# Patient Record
Sex: FEMALE | Race: WHITE | NOT HISPANIC OR LATINO | Employment: PART TIME | URBAN - METROPOLITAN AREA
[De-identification: names, ages, dates, MRNs, and addresses within clinical notes are randomized per-mention and may not be internally consistent; named-entity substitution may affect disease eponyms.]

---

## 2017-04-07 ASSESSMENT — MIFFLIN-ST. JEOR: SCORE: 1620.9

## 2017-04-11 ENCOUNTER — ANESTHESIA - HEALTHEAST (OUTPATIENT)
Dept: SURGERY | Facility: CLINIC | Age: 62
End: 2017-04-11

## 2017-04-11 ENCOUNTER — SURGERY - HEALTHEAST (OUTPATIENT)
Dept: SURGERY | Facility: CLINIC | Age: 62
End: 2017-04-11

## 2017-04-13 ASSESSMENT — MIFFLIN-ST. JEOR: SCORE: 1620.9

## 2017-04-14 ASSESSMENT — MIFFLIN-ST. JEOR: SCORE: 1693.48

## 2017-04-16 VITALS
OXYGEN SATURATION: 92 % | TEMPERATURE: 98.8 F | WEIGHT: 245 LBS | DIASTOLIC BLOOD PRESSURE: 64 MMHG | RESPIRATION RATE: 16 BRPM | SYSTOLIC BLOOD PRESSURE: 120 MMHG | HEART RATE: 78 BPM

## 2017-04-16 RX ORDER — MULTIPLE VITAMINS W/ MINERALS TAB 9MG-400MCG
1 TAB ORAL EVERY MORNING
COMMUNITY

## 2017-04-16 RX ORDER — CALCIUM POLYCARBOPHIL 625 MG 625 MG/1
2 TABLET ORAL AT BEDTIME
COMMUNITY

## 2017-04-16 RX ORDER — PNV NO.95/FERROUS FUM/FOLIC AC 28MG-0.8MG
2 TABLET ORAL AT BEDTIME
COMMUNITY

## 2017-04-16 RX ORDER — AMOXICILLIN 250 MG
2 CAPSULE ORAL 2 TIMES DAILY
COMMUNITY

## 2017-04-16 RX ORDER — RISPERIDONE 4 MG/1
4 TABLET ORAL DAILY
COMMUNITY

## 2017-04-16 RX ORDER — DULOXETIN HYDROCHLORIDE 60 MG/1
120 CAPSULE, DELAYED RELEASE ORAL EVERY MORNING
COMMUNITY

## 2017-04-16 RX ORDER — ATENOLOL 25 MG/1
25 TABLET ORAL EVERY MORNING
COMMUNITY

## 2017-04-16 RX ORDER — OXYBUTYNIN CHLORIDE 10 MG/1
10 TABLET, EXTENDED RELEASE ORAL DAILY
Status: ON HOLD | COMMUNITY
End: 2023-07-26

## 2017-04-17 ENCOUNTER — NURSING HOME VISIT (OUTPATIENT)
Dept: GERIATRICS | Facility: CLINIC | Age: 62
End: 2017-04-17
Payer: MEDICARE

## 2017-04-17 DIAGNOSIS — Z96.611 S/P REVERSE TOTAL SHOULDER ARTHROPLASTY, RIGHT: Primary | ICD-10-CM

## 2017-04-17 DIAGNOSIS — M19.011 PRIMARY OSTEOARTHRITIS OF RIGHT SHOULDER: ICD-10-CM

## 2017-04-17 DIAGNOSIS — I10 ESSENTIAL HYPERTENSION: ICD-10-CM

## 2017-04-17 PROCEDURE — 99306 1ST NF CARE HIGH MDM 50: CPT | Performed by: FAMILY MEDICINE

## 2017-04-17 PROCEDURE — 99207 ZZC CDG-CORRECTLY CODED, REVIEWED AND AGREE: CPT | Performed by: FAMILY MEDICINE

## 2017-04-17 NOTE — MR AVS SNAPSHOT
"              After Visit Summary   4/17/2017    Deepthi Gomez    MRN: [id number]           Patient Information     Date Of Birth          1955        Visit Information        Provider Department      4/17/2017 8:30 AM Angely Montes MD [facility]        Today's Diagnoses     S/p reverse total shoulder arthroplasty, right    -  1    Essential hypertension        Primary osteoarthritis of right shoulder          Care Instructions    Orders:  1.  Decrease tyl #3 1 tab po q 6 hours.  2.  DC previous tyl #3 orders.  3.  Follow up with Dr. Miller in 2 weeks. S/p TRSA -right by Dr Miller on 4/11/17.   4.  CBC, BMP on4/12/17        Follow-ups after your visit        Who to contact     If you have questions or need follow up information about today's clinic visit or your schedule please contact [facility] directly at [phone].  Normal or non-critical lab and imaging results will be communicated to you by MyChart, letter or phone within 4 business days after the clinic has received the results. If you do not hear from us within 7 days, please contact the clinic through Teramindhart or phone. If you have a critical or abnormal lab result, we will notify you by phone as soon as possible.  Submit refill requests through [email or web address] or call your pharmacy and they will forward the refill request to us. Please allow 3 business days for your refill to be completed.          Additional Information About Your Visit        MyChart Information     [email or web address] lets you send messages to your doctor, view your test results, renew your prescriptions, schedule appointments and more. To sign up, go to [email or web address] . Click on \"Log in\" on the left side of the screen, which will take you to the Welcome page. Then click on \"Sign up Now\" on the right side of the page.     You will be asked to enter the access code listed below, as well as some personal information. Please follow the " directions to create your username and password.     Your access code is: [email or web address]  Expires: 2017  3:02 PM     Your access code will  in 90 days. If you need help or a new code, please call your [place] clinic or [phone].        Care EveryWhere ID     This is your Care EveryWhere ID. This could be used by other organizations to access your [place] medical records  [id number]        Your Vitals Were     Pulse Temperature Respirations Pulse Oximetry          78 98.8  F (37.1  C) 16 92%         Blood Pressure from Last 3 Encounters:   17 120/64    Weight from Last 3 Encounters:   17 245 lb (111.1 kg)              Today, you had the following     No orders found for display       Primary Care Provider    None Specified       No primary provider on file.        Thank you!     Thank you for choosing [facility]  for your care. Our goal is always to provide you with excellent care. Hearing back from our patients is one way we can continue to improve our services. Please take a few minutes to complete the written survey that you may receive in the mail after your visit with us. Thank you!             Your Updated Medication List - Protect others around you: Learn how to safely use, store and throw away your medicines at www.disposemymeds.org.          This list is accurate as of: 17 11:59 PM.  Always use your most recent med list.                   Brand Name Dispense Instructions for use    acetaminophen-codeine 300-30 MG per tablet    TYLENOL #3     Take 1 tablet by mouth every 6 hours as needed for moderate pain       ASPIRIN PO      Take 325 mg by mouth 2 times daily       ATENOLOL PO      Take 25 mg by mouth       calcium polycarbophil 625 MG tablet    FIBERCON     Take 2 tablets by mouth daily       DULOXETINE HCL PO      Take 120 mg by mouth daily       FISH OIL OMEGA-3 1000 MG Caps      Take 2 capsules by mouth At Bedtime       GABAPENTIN PO      Take 100 mg  by mouth 3 times daily       menthol 5 % Ptch    ICY HOT     Apply 1 patch topically every 8 hours as needed for muscle soreness       metroNIDAZOLE 0.75 % cream    METROCREAM     Apply topically At Bedtime       multivitamin, therapeutic with minerals Tabs tablet      Take 1 tablet by mouth daily       oxybutynin 10 MG 24 hr tablet    DITROPAN-XL     Take 10 mg by mouth daily       OXYCODONE HCL PO      Take 2.5-5 mg by mouth every 4 hours as needed       RANITIDINE HCL PO      Take 150 mg by mouth 2 times daily       RISPERIDONE PO      Take 4 mg by mouth 2 times daily       senna-docusate 8.6-50 MG per tablet    SENOKOT-S;PERICOLACE     Take 1 tablet by mouth daily       Sodium Chloride 0.65 % Soln      Spray 1 spray in nostril 2 times daily       TYLENOL PO      Take 325-650 mg by mouth every 6 hours as needed for mild pain or fever

## 2017-04-17 NOTE — PROGRESS NOTES
[facility]  PRIMARY CARE PROVIDER AND CLINIC:  No primary care provider on file. No primary physician on file.  Chief Complaint   Patient presents with     Hospital F/U       HPI:    Deepthi Gomez is a 61 year old  (1955),admitted to the [facility] from.  Hospital stay 4/14/17.  Admitted to this facility for  rehab, medical management and nursing care. Current issues are:        1. S/p reverse total shoulder arthroplasty, right -S/p TRSA -right by Dr Miller on 4/11/17. Patient feels too sedated 2 tylenol #3 - would like to decrease to 1 Tyl l #3. Has prn oxycodone for sever pain      2. Essential hypertension -- on atenolol , asa. Patient denies any exertional chest pain, dyspnea, palpitations, syncope, orthopnea, edema or paroxysmal nocturnal dyspnea.     3. Primary osteoarthritis of right shoulder - on tyl           CODE STATUS/ADVANCE DIRECTIVES DISCUSSION:   CPR/Full code   Patient's living condition: lives in a group home     ALLERGIES:Penicillins and Sulfa drugs  PAST MEDICAL HISTORY:  has no past medical history on file.  PAST SURGICAL HISTORY:  has no past surgical history on file.  FAMILY HISTORY: family history is not on file.  SOCIAL HISTORY:      Post Discharge Medication Reconciliation Status: discharge medications reconciled, continue medications without change.  Current Outpatient Prescriptions   Medication Sig Dispense Refill     Omega-3 Fatty Acids (FISH OIL OMEGA-3) 1000 MG CAPS Take 2 capsules by mouth At Bedtime       multivitamin, therapeutic with minerals (THERA-VIT-M) TABS tablet Take 1 tablet by mouth daily       acetaminophen-codeine (TYLENOL #3) 300-30 MG per tablet Take 1-2 tablets by mouth every 6 hours as needed for moderate pain       ASPIRIN PO Take 325 mg by mouth 2 times daily       ATENOLOL PO Take 25 mg by mouth       calcium polycarbophil (FIBERCON) 625 MG tablet Take 2 tablets by mouth daily       DULOXETINE HCL PO Take 120 mg by mouth daily        GABAPENTIN PO Take 100 mg by mouth 3 times daily       menthol (ICY HOT) 5 % PTCH Apply 1 patch topically every 8 hours as needed for muscle soreness       metroNIDAZOLE (METROCREAM) 0.75 % cream Apply topically At Bedtime       oxybutynin (DITROPAN-XL) 10 MG 24 hr tablet Take 10 mg by mouth daily       OXYCODONE HCL PO Take 2.5-5 mg by mouth every 4 hours as needed       RANITIDINE HCL PO Take 150 mg by mouth 2 times daily       RISPERIDONE PO Take 4 mg by mouth 2 times daily       senna-docusate (SENOKOT-S;PERICOLACE) 8.6-50 MG per tablet Take 1 tablet by mouth daily       Saline (SODIUM CHLORIDE) 0.65 % SOLN Spray 1 spray in nostril 2 times daily       Acetaminophen (TYLENOL PO) Take 325-650 mg by mouth every 6 hours as needed for mild pain or fever         ROS:  10 point ROS of systems including Constitutional, Eyes, Respiratory, Cardiovascular, Gastroenterology, Genitourinary, Integumentary, Muscularskeletal, Psychiatric were all negative except for pertinent positives noted in my HPI.    Exam:  /64  Pulse 78  Temp 98.8  F (37.1  C)  Resp 16  Wt 245 lb (111.1 kg)  SpO2 92%      Gen: sitting in chair, alert, cooperative and in no acute distress  HEENT: normocephalic; oropharynx clear  Card: RRR, S1, S2, no murmurs  Resp: lungs clear to auscultation bilaterally, no wheezes or crackles  GI: abdomen soft, not-tender  MSK: normal muscle tone, no LE edema;  Right shoulder incision c/d/i- wearing right shoulder immobilizer   Neuro: CX II-XII grossly in tact; ROM in all four extremities grossly in tact  Psych: alert and oriented x3; normal affect  Skin: warm, no suspicious rashes or lesions noted      ASSESSMENT/PLAN:  1. S/p reverse total shoulder arthroplasty, right    2. Essential hypertension    3. Primary osteoarthritis of right shoulder          Orders:  1.  Decrease tyl #3 1 tab po q 6 hours.  2.  DC previous tyl #3 orders.  3.  Follow up with Dr. Miller in 2 weeks. S/p TRSA -right by Dr Miller  on 4/11/17.   4.  CBC, BMP on4/12/17      Information reviewed:  Medications, vital signs, orders, nursing notes, problem list, hospital information. Total time spent with patient visit was 45 min including patient visit and review of past records. Greater than 50% of total time spent with counseling and coordinating care.    Electronically signed by:  Angely Montes MD, MD

## 2017-04-17 NOTE — PATIENT INSTRUCTIONS
Orders:  1.  Decrease tyl #3 1 tab po q 6 hours.  2.  DC previous tyl #3 orders.  3.  Follow up with Dr. Miller in 2 weeks. S/p TRSA -right by Dr Miller on 4/11/17.   4.  CBC, BMP on4/12/17

## 2017-04-23 VITALS
BODY MASS INDEX: 38.14 KG/M2 | OXYGEN SATURATION: 92 % | HEART RATE: 69 BPM | SYSTOLIC BLOOD PRESSURE: 106 MMHG | HEIGHT: 67 IN | RESPIRATION RATE: 19 BRPM | TEMPERATURE: 98.5 F | WEIGHT: 243 LBS | DIASTOLIC BLOOD PRESSURE: 56 MMHG

## 2017-04-24 ENCOUNTER — NURSING HOME VISIT (OUTPATIENT)
Dept: GERIATRICS | Facility: CLINIC | Age: 62
End: 2017-04-24
Payer: MEDICARE

## 2017-04-24 DIAGNOSIS — Z96.611 S/P REVERSE TOTAL SHOULDER ARTHROPLASTY, RIGHT: Primary | ICD-10-CM

## 2017-04-24 DIAGNOSIS — I10 ESSENTIAL HYPERTENSION: ICD-10-CM

## 2017-04-24 DIAGNOSIS — M19.011 PRIMARY OSTEOARTHRITIS OF RIGHT SHOULDER: ICD-10-CM

## 2017-04-24 PROCEDURE — 99207 ZZC CDG-CORRECTLY CODED, REVIEWED AND AGREE: CPT | Performed by: FAMILY MEDICINE

## 2017-04-24 PROCEDURE — 99309 SBSQ NF CARE MODERATE MDM 30: CPT | Performed by: FAMILY MEDICINE

## 2017-04-24 NOTE — MR AVS SNAPSHOT
"              After Visit Summary   4/24/2017    Deepthi Gomez    MRN: [id number]           Patient Information     Date Of Birth          1955        Visit Information        Provider Department      4/24/2017 10:30 AM Angely Montes MD [facility] Transitional [facility]        Today's Diagnoses     S/p reverse total shoulder arthroplasty, right    -  1    Essential hypertension        Primary osteoarthritis of right shoulder          Care Instructions    Orders:  1.  Follow-up with Ortho 5/2/17.  2.  Anticipate D/C on Friday.        Follow-ups after your visit        Who to contact     If you have questions or need follow up information about today's clinic visit or your schedule please contact [facility] directly at [phone].  Normal or non-critical lab and imaging results will be communicated to you by CUPRhart, letter or phone within 4 business days after the clinic has received the results. If you do not hear from us within 7 days, please contact the clinic through CUPRhart or phone. If you have a critical or abnormal lab result, we will notify you by phone as soon as possible.  Submit refill requests through NanoPotential or call your pharmacy and they will forward the refill request to us. Please allow 3 business days for your refill to be completed.          Additional Information About Your Visit        MyChart Information     NanoPotential lets you send messages to your doctor, view your test results, renew your prescriptions, schedule appointments and more. To sign up, go to [email or web address] . Click on \"Log in\" on the left side of the screen, which will take you to the Welcome page. Then click on \"Sign up Now\" on the right side of the page.     You will be asked to enter the access code listed below, as well as some personal information. Please follow the directions to create your username and password.     Your access code is: [email or web address]  Expires: 7/20/2017  3:02 PM     Your " "access code will  in 90 days. If you need help or a new code, please call your [place] clinic or [phone].        Care EveryWhere ID     This is your Care EveryWhere ID. This could be used by other organizations to access your [place] medical records  [id number]        Your Vitals Were     Pulse Temperature Respirations Height Pulse Oximetry BMI (Body Mass Index)    69 98.5  F (36.9  C) 19 5' 7\" (1.702 m) 92% 38.06 kg/m2       Blood Pressure from Last 3 Encounters:   17 106/56   17 120/64    Weight from Last 3 Encounters:   17 243 lb (110.2 kg)   17 245 lb (111.1 kg)              Today, you had the following     No orders found for display       Primary Care Provider    None Specified       No primary provider on file.        Thank you!     Thank you for choosing [facility]  for your care. Our goal is always to provide you with excellent care. Hearing back from our patients is one way we can continue to improve our services. Please take a few minutes to complete the written survey that you may receive in the mail after your visit with us. Thank you!             Your Updated Medication List - Protect others around you: Learn how to safely use, store and throw away your medicines at www.disposemymeds.org.          This list is accurate as of: 17 11:59 PM.  Always use your most recent med list.                   Brand Name Dispense Instructions for use    acetaminophen-codeine 300-30 MG per tablet    TYLENOL #3     Take 1 tablet by mouth every 6 hours as needed for moderate pain       ASPIRIN PO      Take 325 mg by mouth 2 times daily       ATENOLOL PO      Take 25 mg by mouth       calcium polycarbophil 625 MG tablet    FIBERCON     Take 2 tablets by mouth daily       DULOXETINE HCL PO      Take 120 mg by mouth daily       FISH OIL OMEGA-3 1000 MG Caps      Take 2 capsules by mouth At Bedtime       GABAPENTIN PO      Take 100 mg by mouth 3 times daily       " menthol 5 % Ptch    ICY HOT     Apply 1 patch topically every 8 hours as needed for muscle soreness       metroNIDAZOLE 0.75 % cream    METROCREAM     Apply topically At Bedtime       multivitamin, therapeutic with minerals Tabs tablet      Take 1 tablet by mouth daily       oxybutynin 10 MG 24 hr tablet    DITROPAN-XL     Take 10 mg by mouth daily       OXYCODONE HCL PO      Take 2.5-5 mg by mouth every 4 hours as needed       RANITIDINE HCL PO      Take 150 mg by mouth 2 times daily       RISPERIDONE PO      Take 4 mg by mouth 2 times daily       senna-docusate 8.6-50 MG per tablet    SENOKOT-S;PERICOLACE     Take 1 tablet by mouth daily       Sodium Chloride 0.65 % Soln      Spray 1 spray in nostril 2 times daily       TYLENOL PO      Take 325-650 mg by mouth every 6 hours as needed for mild pain or fever

## 2017-04-24 NOTE — PROGRESS NOTES
[facility]    Chief Complaint   Patient presents with     RECHECK       HPI:    Deepthi Gomez is a 61 year old  (1955), who is being seen today for an episodic care visit at [facility]. Today's concern is:  1. S/p reverse total shoulder arthroplasty, right -- Participating well  in physical therapy  and occupational therapy - working on funciton, strength, balance and ADLS --  antiipate      2. Essential hypertension -- well controlled on atenolol , asa,   BP Readings from Last 3 Encounters:   04/23/17 106/56   04/16/17 120/64        3. Primary osteoarthritis of right shoulder --taking tylenol for general  arthritis pain.        ALLERGIES: Penicillins and Sulfa drugs  Past Medical, Surgical, Family and Social History reviewed and updated in [facility].    Current Outpatient Prescriptions   Medication Sig Dispense Refill     Omega-3 Fatty Acids (FISH OIL OMEGA-3) 1000 MG CAPS Take 2 capsules by mouth At Bedtime       multivitamin, therapeutic with minerals (THERA-VIT-M) TABS tablet Take 1 tablet by mouth daily       acetaminophen-codeine (TYLENOL #3) 300-30 MG per tablet Take 1 tablet by mouth every 6 hours as needed for moderate pain        ASPIRIN PO Take 325 mg by mouth 2 times daily       ATENOLOL PO Take 25 mg by mouth       calcium polycarbophil (FIBERCON) 625 MG tablet Take 2 tablets by mouth daily       DULOXETINE HCL PO Take 120 mg by mouth daily       GABAPENTIN PO Take 100 mg by mouth 3 times daily       menthol (ICY HOT) 5 % PTCH Apply 1 patch topically every 8 hours as needed for muscle soreness       metroNIDAZOLE (METROCREAM) 0.75 % cream Apply topically At Bedtime       oxybutynin (DITROPAN-XL) 10 MG 24 hr tablet Take 10 mg by mouth daily       OXYCODONE HCL PO Take 2.5-5 mg by mouth every 4 hours as needed       RANITIDINE HCL PO Take 150 mg by mouth 2 times daily       RISPERIDONE PO Take 4 mg by mouth 2 times daily       senna-docusate (SENOKOT-S;PERICOLACE) 8.6-50  "MG per tablet Take 1 tablet by mouth daily       Saline (SODIUM CHLORIDE) 0.65 % SOLN Spray 1 spray in nostril 2 times daily       Acetaminophen (TYLENOL PO) Take 325-650 mg by mouth every 6 hours as needed for mild pain or fever       Medications reviewed:  Medications reconciled to facility chart and changes were made to reflect current medications as identified as above med list. Below are the changes that were made:   Medications stopped since last EPIC medication reconciliation:   There are no discontinued medications.    Medications started since last [facility] medication reconciliation:  No orders of the defined types were placed in this encounter.      REVIEW OF SYSTEMS:  10 point ROS of systems including Constitutional, Eyes, Respiratory, Cardiovascular, Gastroenterology, Genitourinary, Integumentary, Muscularskeletal, Psychiatric were all negative except for pertinent positives noted in my HPI.    Physical Exam:  /56  Pulse 69  Temp 98.5  F (36.9  C)  Resp 19  Ht 5' 7\" (1.702 m)  Wt 243 lb (110.2 kg)  SpO2 92%  BMI 38.06 kg/m2  Gen: sitting in chair, alert, cooperative and in no acute distress  HEENT: normocephalic; oropharynx clear  Card: RRR, S1, S2, no murmurs  Resp: lungs clear to auscultation bilaterally, no wheezes or crackles  GI: abdomen soft, not-tender  MSK: right shoulder in sling- incision c/d/i   Neuro: CX II-XII grossly in tact; ROM in all four extremities grossly in tact  Psych: alert and oriented x3; normal affect  Skin: warm, no suspicious rashes or lesions noted      Recent Labs:  No results found for this or any previous visit (from the past 48 hour(s)).    Assessment/Plan:     S/p reverse total shoulder arthroplasty, right  Essential hypertension  Primary osteoarthritis of right shoulder    Orders:  1.  Follow-up with Ortho 5/2/17.  2.  Anticipate D/C on Friday. Doing well ans pain well controlled.      Electronically signed by  Angely Montes MD, MD                  "

## 2017-04-26 VITALS
RESPIRATION RATE: 20 BRPM | OXYGEN SATURATION: 92 % | HEART RATE: 69 BPM | WEIGHT: 247 LBS | BODY MASS INDEX: 38.69 KG/M2 | TEMPERATURE: 98.2 F | DIASTOLIC BLOOD PRESSURE: 51 MMHG | SYSTOLIC BLOOD PRESSURE: 119 MMHG

## 2017-04-26 NOTE — PROGRESS NOTES
[facility] DISCHARGE SUMMARY    PATIENT'S NAME: Deepthi Gomez  YOB: 1955  MEDICAL RECORD NUMBER:  [id number]    PRIMARY CARE PROVIDER AND CLINIC RESPONSIBLE AFTER TRANSFER: No primary care provider on file. No primary physician on file.     CODE STATUS/ADVANCE DIRECTIVES DISCUSSION:   CPR/Full code        Allergies   Allergen Reactions     Penicillins      Sulfa Drugs        TRANSFERRING PROVIDERS: Angely Montes MD,CMD   DATE OF SNF ADMISSION:  April / 14 / 2017  DATE OF SNF (anticipated) DISCHARGE: May / 01 / 2017  DISCHARGE DISPOSITION: Non-[facility] Provider   Nursing Facility: [facility] stay 4/11/17 to 4/14/17.     Condition on Discharge:  Improving.  Function:    Cognitive Scores: CPT 5.0    Equipment: no aids    DISCHARGE DIAGNOSIS:   1. S/p reverse total shoulder arthroplasty, right    2. Essential hypertension    3. Primary osteoarthritis of right shoulder        [facility] Nursing Facility Course:  Patient was admitted to facility after hospitalization for Right shoulder replacement. Patient participated in PT/OT.  Patient will discharge home with outpatient PT with [facility].  1. S/p reverse total shoulder arthroplasty, right    2. Essential hypertension    3. Primary osteoarthritis of right shoulder        PAST MEDICAL HISTORY:  has no past medical history on file.    DISCHARGE MEDICATIONS:  Current Outpatient Prescriptions   Medication Sig Dispense Refill     Omega-3 Fatty Acids (FISH OIL OMEGA-3) 1000 MG CAPS Take 2 capsules by mouth At Bedtime       multivitamin, therapeutic with minerals (THERA-VIT-M) TABS tablet Take 1 tablet by mouth daily       acetaminophen-codeine (TYLENOL #3) 300-30 MG per tablet Take 1 tablet by mouth every 6 hours as needed for moderate pain        ASPIRIN PO Take 325 mg by mouth 2 times daily       ATENOLOL PO Take 25 mg by mouth       calcium polycarbophil (FIBERCON) 625 MG tablet Take 2 tablets by mouth  daily       DULOXETINE HCL PO Take 120 mg by mouth daily       GABAPENTIN PO Take 100 mg by mouth 3 times daily       menthol (ICY HOT) 5 % PTCH Apply 1 patch topically every 8 hours as needed for muscle soreness       metroNIDAZOLE (METROCREAM) 0.75 % cream Apply topically At Bedtime       oxybutynin (DITROPAN-XL) 10 MG 24 hr tablet Take 10 mg by mouth daily       OXYCODONE HCL PO Take 2.5-5 mg by mouth every 4 hours as needed       RANITIDINE HCL PO Take 150 mg by mouth 2 times daily       RISPERIDONE PO Take 4 mg by mouth 2 times daily       senna-docusate (SENOKOT-S;PERICOLACE) 8.6-50 MG per tablet Take 1 tablet by mouth daily       Saline (SODIUM CHLORIDE) 0.65 % SOLN Spray 1 spray in nostril 2 times daily       Acetaminophen (TYLENOL PO) Take 325-650 mg by mouth every 6 hours as needed for mild pain or fever         MEDICATION CHANGES/RATIONALE:   none  Controlled medications sent with patient: TYl #3- 30     ROS:    10 point ROS of systems including Constitutional, Eyes, Respiratory, Cardiovascular, Gastroenterology, Genitourinary, Integumentary, Muscularskeletal, Psychiatric were all negative except for pertinent positives noted in my HPI.    Physical Exam:   Vitals: /51  Pulse 69  Temp 98.2  F (36.8  C)  Resp 20  Wt 247 lb (112 kg)  SpO2 92%  BMI 38.69 kg/m2  BMI= Body mass index is 38.69 kg/(m^2).    Gen: sitting in chair, alert, cooperative and in no acute distress  HEENT: normocephalic; oropharynx clear  Card: RRR, S1, S2, no murmurs  Resp: lungs clear to auscultation bilaterally, no wheezes or crackles  GI: abdomen soft, not-tender  MSK: normal muscle tone, no LE edema  Neuro: CX II-XII grossly in tact; ROM in all four extremities grossly in tact  Psych: alert and oriented x3; normal affect  Skin: warm, no suspicious rashes or lesions noted      DISCHARGE PLAN:  Out patient PT  Patient instructed to follow-up with:  PCP in 7 days      Current [place] scheduled appointments:  Future  Appointments  Date Time Provider Department Center   4/27/2017 11:00 AM [facility] and [facility] by this provider: No    Pending labs: none  SNF labs   Date:  4/19/17  Na 143, K+ 4.7, BUN 18, Creat 0.86, eGFR >60  Ca 9.9  Discharge Treatments: continue to wear shoulder immobilizer as directed     1. Okay to discharge to group home on 5/1/17  2. F/U with Ortho on 5/2/17    TOTAL DISCHARGE TIME:   Greater than 30 minutes  Electronically signed by:  Angely Montes MD, MD

## 2017-04-27 ENCOUNTER — NURSING HOME VISIT (OUTPATIENT)
Dept: GERIATRICS | Facility: CLINIC | Age: 62
End: 2017-04-27
Payer: MEDICARE

## 2017-04-27 DIAGNOSIS — M19.011 PRIMARY OSTEOARTHRITIS OF RIGHT SHOULDER: ICD-10-CM

## 2017-04-27 DIAGNOSIS — I10 ESSENTIAL HYPERTENSION: ICD-10-CM

## 2017-04-27 DIAGNOSIS — Z96.611 S/P REVERSE TOTAL SHOULDER ARTHROPLASTY, RIGHT: Primary | ICD-10-CM

## 2017-04-27 PROCEDURE — 99207 ZZC CDG-CORRECTLY CODED, REVIEWED AND AGREE: CPT | Performed by: FAMILY MEDICINE

## 2017-04-27 PROCEDURE — 99316 NF DSCHRG MGMT 30 MIN+: CPT | Performed by: FAMILY MEDICINE

## 2017-04-27 NOTE — MR AVS SNAPSHOT
"              After Visit Summary   2017    Deepthi Gomez    MRN: [id number]           Patient Information     Date Of Birth          1955        Visit Information        Provider Department      2017 11:00 AM Angely Montes MD [facility]        Today's Diagnoses     S/p reverse total shoulder arthroplasty, right    -  1    Essential hypertension        Primary osteoarthritis of right shoulder           Follow-ups after your visit        Who to contact     If you have questions or need follow up information about today's clinic visit or your schedule please contact [facility] directly at [phone].  Normal or non-critical lab and imaging results will be communicated to you by [employer], letter or phone within 4 business days after the clinic has received the results. If you do not hear from us within 7 days, please contact the clinic through [employer] or phone. If you have a critical or abnormal lab result, we will notify you by phone as soon as possible.  Submit refill requests through [employer] or call your pharmacy and they will forward the refill request to us. Please allow 3 business days for your refill to be completed.          Additional Information About Your Visit        MyChart Information     [employer] lets you send messages to your doctor, view your test results, renew your prescriptions, schedule appointments and more. To sign up, go to [email or web address] . Click on \"Log in\" on the left side of the screen, which will take you to the Welcome page. Then click on \"Sign up Now\" on the right side of the page.     You will be asked to enter the access code listed below, as well as some personal information. Please follow the directions to create your username and password.     Your access code is: [email or web address]  Expires: 2017  3:02 PM     Your access code will  in 90 days. If you need help or a new code, please call your [facility] clinic " or [phone].        Care EveryWhere ID     This is your Care EveryWhere ID. This could be used by other organizations to access your [facility] medical records  [id number]        Your Vitals Were     Pulse Temperature Respirations Pulse Oximetry BMI (Body Mass Index)       69 98.2  F (36.8  C) 20 92% 38.69 kg/m2        Blood Pressure from Last 3 Encounters:   04/26/17 119/51   04/23/17 106/56   04/16/17 120/64    Weight from Last 3 Encounters:   04/26/17 247 lb (112 kg)   04/23/17 243 lb (110.2 kg)   04/16/17 245 lb (111.1 kg)              Today, you had the following     No orders found for display       Primary Care Provider    None Specified       No primary provider on file.        Thank you!     Thank you for choosing [facility]  for your care. Our goal is always to provide you with excellent care. Hearing back from our patients is one way we can continue to improve our services. Please take a few minutes to complete the written survey that you may receive in the mail after your visit with us. Thank you!             Your Updated Medication List - Protect others around you: Learn how to safely use, store and throw away your medicines at www.disposemymeds.org.          This list is accurate as of: 4/27/17 11:59 PM.  Always use your most recent med list.                   Brand Name Dispense Instructions for use    acetaminophen-codeine 300-30 MG per tablet    TYLENOL #3     Take 1 tablet by mouth every 6 hours as needed for moderate pain       ASPIRIN PO      Take 325 mg by mouth 2 times daily       ATENOLOL PO      Take 25 mg by mouth       calcium polycarbophil 625 MG tablet    FIBERCON     Take 2 tablets by mouth daily       DULOXETINE HCL PO      Take 120 mg by mouth daily       FISH OIL OMEGA-3 1000 MG Caps      Take 2 capsules by mouth At Bedtime       GABAPENTIN PO      Take 100 mg by mouth 3 times daily       menthol 5 % Ptch    ICY HOT     Apply 1 patch topically every 8 hours as  needed for muscle soreness       metroNIDAZOLE 0.75 % cream    METROCREAM     Apply topically At Bedtime       multivitamin, therapeutic with minerals Tabs tablet      Take 1 tablet by mouth daily       oxybutynin 10 MG 24 hr tablet    DITROPAN-XL     Take 10 mg by mouth daily       OXYCODONE HCL PO      Take 2.5-5 mg by mouth every 4 hours as needed       RANITIDINE HCL PO      Take 150 mg by mouth 2 times daily       RISPERIDONE PO      Take 4 mg by mouth 2 times daily       senna-docusate 8.6-50 MG per tablet    SENOKOT-S;PERICOLACE     Take 1 tablet by mouth daily       Sodium Chloride 0.65 % Soln      Spray 1 spray in nostril 2 times daily       TYLENOL PO      Take 325-650 mg by mouth every 6 hours as needed for mild pain or fever

## 2018-02-16 ENCOUNTER — RECORDS - HEALTHEAST (OUTPATIENT)
Dept: ADMINISTRATIVE | Facility: OTHER | Age: 63
End: 2018-02-16

## 2019-03-12 ENCOUNTER — OFFICE VISIT - HEALTHEAST (OUTPATIENT)
Dept: SURGERY | Facility: CLINIC | Age: 64
End: 2019-03-12

## 2019-03-12 DIAGNOSIS — R73.03 PRE-DIABETES: ICD-10-CM

## 2019-03-12 DIAGNOSIS — E66.9 OBESITY (BMI 30-39.9): ICD-10-CM

## 2019-03-12 ASSESSMENT — MIFFLIN-ST. JEOR: SCORE: 1716.16

## 2019-05-03 ENCOUNTER — OFFICE VISIT - HEALTHEAST (OUTPATIENT)
Dept: SURGERY | Facility: CLINIC | Age: 64
End: 2019-05-03

## 2019-05-03 DIAGNOSIS — R73.03 PRE-DIABETES: ICD-10-CM

## 2019-05-03 DIAGNOSIS — E66.811 OBESITY, CLASS I, BMI 30.0-34.9 (SEE ACTUAL BMI): ICD-10-CM

## 2019-05-03 DIAGNOSIS — Z71.3 NUTRITIONAL COUNSELING: ICD-10-CM

## 2019-05-03 ASSESSMENT — MIFFLIN-ST. JEOR: SCORE: 1588.7

## 2019-05-21 ENCOUNTER — OFFICE VISIT - HEALTHEAST (OUTPATIENT)
Dept: SURGERY | Facility: CLINIC | Age: 64
End: 2019-05-21

## 2019-05-21 DIAGNOSIS — E66.811 OBESITY, CLASS I, BMI 30.0-34.9 (SEE ACTUAL BMI): ICD-10-CM

## 2019-05-21 DIAGNOSIS — R73.03 PRE-DIABETES: ICD-10-CM

## 2019-05-21 ASSESSMENT — MIFFLIN-ST. JEOR: SCORE: 1577.36

## 2019-06-28 ENCOUNTER — OFFICE VISIT - HEALTHEAST (OUTPATIENT)
Dept: SURGERY | Facility: CLINIC | Age: 64
End: 2019-06-28

## 2019-06-28 DIAGNOSIS — E66.811 OBESITY, CLASS I, BMI 30.0-34.9 (SEE ACTUAL BMI): ICD-10-CM

## 2019-06-28 DIAGNOSIS — R73.03 PRE-DIABETES: ICD-10-CM

## 2019-06-28 DIAGNOSIS — Z71.3 NUTRITIONAL COUNSELING: ICD-10-CM

## 2019-06-28 ASSESSMENT — MIFFLIN-ST. JEOR: SCORE: 1556.95

## 2019-08-16 ENCOUNTER — OFFICE VISIT - HEALTHEAST (OUTPATIENT)
Dept: SURGERY | Facility: CLINIC | Age: 64
End: 2019-08-16

## 2019-08-16 DIAGNOSIS — Z71.3 NUTRITIONAL COUNSELING: ICD-10-CM

## 2019-08-16 DIAGNOSIS — R73.03 PRE-DIABETES: ICD-10-CM

## 2019-08-16 DIAGNOSIS — E66.811 OBESITY, CLASS I, BMI 30.0-34.9 (SEE ACTUAL BMI): ICD-10-CM

## 2019-08-16 ASSESSMENT — MIFFLIN-ST. JEOR: SCORE: 1553.32

## 2019-08-20 ENCOUNTER — OFFICE VISIT - HEALTHEAST (OUTPATIENT)
Dept: SURGERY | Facility: CLINIC | Age: 64
End: 2019-08-20

## 2019-08-20 DIAGNOSIS — R73.03 PRE-DIABETES: ICD-10-CM

## 2019-08-20 DIAGNOSIS — E66.811 OBESITY, CLASS I, BMI 30.0-34.9 (SEE ACTUAL BMI): ICD-10-CM

## 2019-08-20 ASSESSMENT — MIFFLIN-ST. JEOR: SCORE: 1548.33

## 2020-07-14 ENCOUNTER — AMBULATORY - HEALTHEAST (OUTPATIENT)
Dept: SURGERY | Facility: CLINIC | Age: 65
End: 2020-07-14

## 2020-07-14 DIAGNOSIS — Z11.59 ENCOUNTER FOR SCREENING FOR OTHER VIRAL DISEASES: ICD-10-CM

## 2020-09-03 ENCOUNTER — AMBULATORY - HEALTHEAST (OUTPATIENT)
Dept: OTHER | Facility: CLINIC | Age: 65
End: 2020-09-03

## 2020-09-09 ASSESSMENT — MIFFLIN-ST. JEOR: SCORE: 1487.09

## 2020-09-19 ENCOUNTER — AMBULATORY - HEALTHEAST (OUTPATIENT)
Dept: FAMILY MEDICINE | Facility: CLINIC | Age: 65
End: 2020-09-19

## 2020-09-19 DIAGNOSIS — Z11.59 ENCOUNTER FOR SCREENING FOR OTHER VIRAL DISEASES: ICD-10-CM

## 2020-09-21 ENCOUNTER — AMBULATORY - HEALTHEAST (OUTPATIENT)
Dept: OTHER | Facility: CLINIC | Age: 65
End: 2020-09-21

## 2020-09-21 ENCOUNTER — COMMUNICATION - HEALTHEAST (OUTPATIENT)
Dept: SCHEDULING | Facility: CLINIC | Age: 65
End: 2020-09-21

## 2020-09-22 ENCOUNTER — SURGERY - HEALTHEAST (OUTPATIENT)
Dept: SURGERY | Facility: CLINIC | Age: 65
End: 2020-09-22

## 2020-09-22 ENCOUNTER — ANESTHESIA - HEALTHEAST (OUTPATIENT)
Dept: SURGERY | Facility: CLINIC | Age: 65
End: 2020-09-22

## 2020-09-22 ASSESSMENT — MIFFLIN-ST. JEOR: SCORE: 1455.33

## 2020-09-24 ENCOUNTER — HOME CARE/HOSPICE - HEALTHEAST (OUTPATIENT)
Dept: HOME HEALTH SERVICES | Facility: HOME HEALTH | Age: 65
End: 2020-09-24

## 2021-05-28 NOTE — PROGRESS NOTES
Medical  Weight Loss Initial Diet Evaluation    Deepthi Gomez is a 63 y.o. year old female presenting today for a new weight management nutrition consultation. Pt has also had an initial appointment with Bariatrician Dr. Martell.   Ann presents today with her sister and the owner of her group home. She has made dramatic changes since moving to her group home by eating three meals per day and limiting carbohydrates and sweets.  Nutrition Assessment:   Anthropometrics:  Pt's Initial Weight: 251 lbs  Weight: (!) 222 lb 14.4 oz (101.1 kg)  Weight loss from initial: 28.1  % Weight loss: 11.2 %    BMI: Body mass index is 34.91 kg/m .  IBW: 135-140lb  Estimated RMR ([place]-St Jeor equation): 1608  Estimated protein needs (0.6-0.8 grams per current kg weight): 67-200g    Medical History:  Patient Active Problem List   Diagnosis     Status post total shoulder arthroplasty, right     Adenomatous polyp of colon     Atypical depression     Closed fracture of ankle     Contusion, knee     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)     Diverticulitis of colon     LUC (generalized anxiety disorder)     Irritable bowel syndrome     Lateral epicondylitis     Left renal mass     Marfan's syndrome     Menopausal syndrome (hot flashes)     Moderate intellectual disabilities     Neural hearing loss, bilateral     Neurogenic bladder     Obesity     Primary osteoarthritis of right shoulder     SAH (subarachnoid hemorrhage) (H)     Psychosis (H)     Sleep apnea     S/p nephrectomy     Nutrition History:   Food allergies/intolerances/restrictions: No  Biggest weight loss struggle per pt: sweet tooth- mandi's pieces, skipping meals, drinking soda  Vitamins/Mineral Supplementation: fiber supplement, multivitamin, fish oil    Dietary Recall: 9a  Breakfast: 2 eggs, greek yogurt, glass of milk or crystal light and sometimes cheese stick (30g)  Snack:none- sometimes a piece of fruit  Lunch: tuna, banana and cheese stick (30g)  Snack:  none  Dinner: birdseye dinner with chicken and veggies and an extra bag of broccoli and a salad  Snack: none  Overnight eating: No    Hydration (type/oz. per day):  Water: crystal light and vitamin water zero  Caffeine/carbonation:none  Juice: none  Alcohol : none    Exercise:  Routine exercise established: Yes  [employer] 4 times per week- biking and weights-     Nutrition Diagnosis (PES statement):   (NB-1.7) Undesirable food choices related to food and nutrition related knowledge deficit as evidenced by Intake of high caloric density foods/beverages (juice, soda, alcohol) at meals and/or snacks; large portion; frequent grazing; Estimated intake that exceeds estimated daily energy intake; Binge eating patterns; Frequent excessive fast food or restaurant intake; and BMI 34.91    Nutrition Intervention:  1. Discussed with patient how to build a meal: the importance of including a lean/low fat protein at each meal, include a source of vegetables at a minimum of lunch and dinner, and limiting carbohydrate intake to 1-2 servings (15-30 grams) per meal.  2. Placed emphasis on importance of developing a healthy eating routine, aiming for 3 meals a day and no snacks.   3. Educated on sources of lean protein, portion sizes, and the amount of grams found in each source. Recommend pt to aim for 20-30 grams of protein at each meal; 60-80 grams per day  4. Discussed importance of adequate hydration, with emphasis on drinking 64 oz of water or zero calorie containing beverages per day.   5. Reviewed carbohydrate portions and recommendations.   Handouts provided:  Carbohydrates  Plate Method  List of Lean Protein Sources  Food label  Nutrition Monitoring/Evaluation:   Nutrition Goal Established by Patient:  1. Go to the gym 5 days per week  2. Drink 8 cups of water, crystal light or lifewater zero per day   Follow up/Monitoring:  Will monitor weight change, protein intake, hydration, fruit and vegetable intake and  exercise for next visit.  Follow up with RD in 8 weeks.      Time In: 10:30a  Time Out: 11:30a  ABN: Yes

## 2021-05-29 NOTE — PATIENT INSTRUCTIONS - HE

## 2021-05-29 NOTE — PROGRESS NOTES
[facility] Non Surgical Follow up Visit   Date of visit: 5/21/2019  Physician: Lucia Martell MD  Primary Care is Vandana Boothe MD.  Deepthi Gomez   64 y.o.  female    Initial Weight: 251#  Initial BMI: 39.31  Today's Weight:   Wt Readings from Last 1 Encounters:   05/21/19 220 lb 6.4 oz (100 kg)     Body mass index is 34.52 kg/m .  Initial Weight: 251 lbs  Weight: 220 lb 6.4 oz (100 kg)  Weight loss from initial: 28.1  % Weight loss: 11.2 %       Assessment and Plan   Assessment: Deepthi is a 64 y.o. year old female who presents for medical weight management.      Plan:    1. Obesity, Class I, BMI 30.0-34.9 (see actual BMI)  Patient was congratulated on her success thus far. Healthy habits to assist with further weight loss were discussed. Written information was given. She is not interested in medication.     2. Pre-diabetes  This should improve with healthy habits and weight loss.      Follow up in one month with our dietician and in 3 months with myself.           INTERIM HISTORY  Patient has moved into a new house that focuses on healthy meals. As a result she has been successful with weight loss. She is here with the supervisor of her group home who plans and prepares meals for the residents    DIETARY HISTORY  Meals Per Day: 3  Eating Protein First?: usually  Food Diary: B:2 eggs and crystal light and oikos triple zero and apple and banana L:sandwich on 1 piece of bread or hard boiled egg or tuna D:meat and salad and veggies and sometimes potato  Snacks Per Day: occasional  Typical Snack: apple or banana  Fluid Intake: crystal light 32 oz per day  Portion Control: yes  Calorie Containing Beverages: milk  Typical Protein Food Choices: meat, eggs, yogurt  Choosing Whole Grains: yes  Meals at Restaurant per week:0-2  Eating at the Table: yes  TV is Off During Meals: yes    Positive Changes Since Last Visit: decreased starches, increased protein, eating 3 meals per day, regular  exercise  Struggling With: drinking water     Knowledgeable in Reading Food Labels: no- but supervisor is  Getting Adequate Protein: usually  Sleeping 7-8 hours/day yes  Stress management not discussed    PHYSICAL ACTIVITY PATTERNS:  Cardiovascular: goes to [facility] 5 days per week, bikes for 35 minutes  Strength Training: weights, working with     REVIEW OF SYSTEMS  GENERAL/CONSTITUTIONAL:  Fatigue: sometimes  HEENT:  Vision changes, glaucoma: no  CARDIOVASCULAR:  Chest Pain with Exertion: no  PULMONARY:  Dyspnea on exertion: no  NEUROLOGIC:  Paresthesias: no  PSYCHIATRIC:  Moods: happy  MUSCULOSKELETAL/RHEUMATOLOGIC  Arthralgias: no  Myalgias: no  ENDOCRINE:  Monitoring Blood Sugars: na  Sugars Well Controlled: na       Patient Profile   Social History     Social History Narrative     Not on file        Past Medical History   Past Medical History:   Diagnosis Date     Adenomatous polyp of colon      Benign neoplasm of colon      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)      Depressive disorder      Diverticula of colon      DJD (degenerative joint disease)      LUC (generalized anxiety disorder)      Irritable bowel syndrome      Lateral epicondylitis      Left renal mass      Marfan's syndrome      Menopausal syndrome      MR (mental retardation)      Neural hearing loss      Neurogenic bladder      Obesity      Osteoarthritis of right shoulder      Psychosis (H)      S/p nephrectomy      SAH (subarachnoid hemorrhage) (H)      Sleep apnea     uses CPAP     Patient Active Problem List   Diagnosis     Status post total shoulder arthroplasty, right     Adenomatous polyp of colon     Atypical depression     Closed fracture of ankle     Contusion, knee     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)     Diverticulitis of colon     LUC (generalized anxiety disorder)     Irritable bowel syndrome     Lateral epicondylitis     Left renal mass     Marfan's syndrome     Menopausal syndrome (hot flashes)     Moderate  intellectual disabilities     Neural hearing loss, bilateral     Neurogenic bladder     Obesity     Primary osteoarthritis of right shoulder     SAH (subarachnoid hemorrhage) (H)     Psychosis (H)     Sleep apnea     S/p nephrectomy     Current Outpatient Medications   Medication Sig Note     acetaminophen-codeine (TYLENOL #3) 300-30 mg per tablet Take 1-2 tablets by mouth every 6 (six) hours.      atenolol (TENORMIN) 25 MG tablet Take 25 mg by mouth daily.      calcium polycarbophil (FIBERCON) 625 mg tablet Take 1,250 mg by mouth every evening.      clindamycin (CLEOCIN) 300 MG capsule Take 300 mg by mouth.      DULoxetine (CYMBALTA) 60 MG capsule Take 120 mg by mouth daily. Take two capsules daily       gabapentin (NEURONTIN) 100 MG capsule Take 100 mg by mouth 3 (three) times a day.      loperamide (IMODIUM) 2 mg capsule Take 2 mg by mouth.      melatonin 3 mg Tab tablet Take 6 mg by mouth.      menthol (ICY HOT, MENTHOL,) 5 % PtMd Apply 1 patch topically daily as needed (For Shoulde and knee pain).      metroNIDAZOLE (METROCREAM) 0.75 % cream Apply 1 application topically at bedtime. Apply to face. 4/11/2017: Apply to face     multivitamin with minerals (THERA-M) 9 mg iron-400 mcg Tab tablet Take 1 tablet by mouth daily.      omega-3 acid ethyl esters (LOVAZA) 1 gram capsule Take 1,000 mg by mouth.      OMEGA-3/DHA/EPA/FISH OIL (FISH OIL-OMEGA-3 FATTY ACIDS) 300-1,000 mg capsule Take 2 g by mouth every evening.       oxybutynin (DITROPAN XL) 10 MG ER tablet Take 10 mg by mouth daily.      ranitidine (ZANTAC) 150 MG tablet Take 150 mg by mouth 2 (two) times a day.      risperiDONE (RISPERDAL) 4 MG tablet Take 4 mg by mouth 2 (two) times a day.      senna-docusate (SENNOSIDES-DOCUSATE SODIUM) 8.6-50 mg tablet Take 1-2 tablets by mouth daily.      sodium chloride (OCEAN) 0.65 % nasal spray 1 spray into each nostril 2 (two) times a day.         Past Surgical History  She has a past surgical history that includes  "Hysterectomy; Cerebral aneurysm repair; Nephrectomy (2013); and pr reconstr total shoulder implant (Right, 4/11/2017).     Examination   /74 (Patient Site: Left Arm, Patient Position: Sitting, Cuff Size: Adult Large)   Pulse 82   Ht 5' 7\" (1.702 m)   Wt 220 lb 6.4 oz (100 kg)   SpO2 92%   Breastfeeding? No   BMI 34.52 kg/m    Height: 5' 7\" (1.702 m) (5/21/2019  3:14 PM)  Initial Weight: 251 lbs (5/3/2019 10:00 AM)  Weight: 220 lb 6.4 oz (100 kg) (5/21/2019  3:14 PM)  Weight loss from initial: 28.1 (5/3/2019 10:00 AM)  % Weight loss: 11.2 % (5/3/2019 10:00 AM)  BMI (Calculated): 34.5 (5/21/2019  3:14 PM)  SpO2: 92 % (5/21/2019  3:14 PM)    General:  Alert and ambulatory, NAD  HEENT: Moist mucous membranes, neck is without LAD  Pulmonary:  Normal respiratory effort, no cough, no audible wheezes/crackles.  CV:  Regular rate and Rhythm, no murmurs  Pscyh/Mood: stable         Counseling:   We reviewed the important post op bariatric recommendations:  -eating 3 meals daily  -eating protein first, getting >60gm protein daily  -eating slowly, chewing food well  -avoiding/limiting calorie containing beverages  -limiting starchy vegetables and carbohydrates, choosing wheat, not white with breads,   crackers, pastas, mikhail, bagels, tortillas, rice  -limiting restaurant or cafeteria eating to twice a week or less    We discussed the importance of restorative sleep and stress management in maintaining a healthy weight.  We discussed the National Weight Control Registry healthy weight maintenance strategies and ways to optimize metabolism.  We discussed the importance of physical activity including cardiovascular and strength training in maintaining a healthier weight.    > 25 min spent with patient, > 50% spent in counseling         MING Martell MD  [facility].    Much or all of the text in this note was generated through the use of Dragon Dictate voice-to-text software. Errors in spelling or " words which seem out of context are unintentional. Sound alike errors, in particular, may have escaped editing.

## 2021-05-30 VITALS — HEIGHT: 67 IN | BODY MASS INDEX: 38.61 KG/M2 | WEIGHT: 246 LBS

## 2021-05-30 NOTE — PROGRESS NOTES
Medical  Weight Loss Follow-Up Diet Evaluation  Assessment:  Deepthi is presenting today for a follow up weight management nutrition consultation. Pt has had an initial appointment with Bariatrician Dr. Martell.  Pt's Initial Weight: 251 lbs  Weight: 215 lb 14.4 oz (97.9 kg)  Weight loss from initial: 35.1  % Weight loss: 13.98 %    BMI: Body mass index is 33.81 kg/m .  IBW: 135-145 lbs    Estimated RMR ([place]-St Jeor equation): 1572kcal   Estimated protein needs (0.6-0.8 grams per IBW): 81-108g  Patient Active Problem List:  Patient Active Problem List   Diagnosis     Status post total shoulder arthroplasty, right     Adenomatous polyp of colon     Atypical depression     Closed fracture of ankle     Contusion, knee     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)     Diverticulitis of colon     LCU (generalized anxiety disorder)     Irritable bowel syndrome     Lateral epicondylitis     Left renal mass     Marfan's syndrome     Menopausal syndrome (hot flashes)     Moderate intellectual disabilities     Neural hearing loss, bilateral     Neurogenic bladder     Obesity     Primary osteoarthritis of right shoulder     SAH (subarachnoid hemorrhage) (H)     Psychosis (H)     Sleep apnea     S/p nephrectomy     Diabetes: No     Progress on goals from last visit:   1. Gym 5 days per week- goal somewhat met- going 4 days per week  2. 8 cups of water per day- goal not met- getting about 4 cups per day    Dietary Recall:  Breakfast: pancakes, greek yogurt and a banana and a cup of juice  Snack: none- handful of pretzels  Lunch: vitamin water, macaroni and cheese and protein  Snack: sometimes- pretzels  Dinner: chicken stir medley with mixed veggies and salad   Hydration (type/oz. per day):  Water: 4 cups- crystal light, vitamin water zero  Caffeine: none  Juice: 1 cup in morning  Alcohol : none  Exercise:  Routine exercise established: Yes  Gym 4 days per week- biking     Nutrition Diagnosis:    (NB-1.7) Undesirable food choices  related to food and nutrition related knowledge deficit as evidenced by Intake of high caloric density foods/beverages (juice, soda, alcohol) at meals and/or snacks; large portion; frequent grazing; Estimated intake that exceeds estimated daily energy intake; and BMI 33.81    Intervention:  1. Recommend calorie/nutrient modification    Implementation:  1. Reviewed progress with previous goals  2. Nutrition Education- educated on hydration and portion sizes    Monitoring/Evaluation:    Goals:  1. Drink 6 cups of water per day- have vitamin water in room to sip on    Follow up:  Pt will follow up in 2 month(s) with bariatrician and 2 month(s) with dietitian.     Time spent with patient: 25 minutes  Sarah Sprague RD     ABN signed: Yes

## 2021-05-31 NOTE — PROGRESS NOTES
Medical  Weight Loss Follow-Up Diet Evaluation  Assessment:  Deepthi is presenting today for a follow up weight management nutrition consultation. Pt has had an initial appointment with Dr. Martell.    Patient is here today with an aide from her home.     Pt's Initial Weight: 251 lbs  Weight: 215 lb 1.6 oz (97.6 kg)  Weight loss from initial: 35.9  % Weight loss: 14.3 %    BMI: Body mass index is 33.69 kg/m .  IBW: 135-145 lbs    Estimated RMR ([place]-St Jeor equation): 1568kcal   Recommended Protein Intake: 60-80 grams of protein/day  Patient Active Problem List:  Patient Active Problem List   Diagnosis     Status post total shoulder arthroplasty, right     Adenomatous polyp of colon     Atypical depression     Closed fracture of ankle     Contusion, knee     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)     Diverticulitis of colon     LUC (generalized anxiety disorder)     Irritable bowel syndrome     Lateral epicondylitis     Left renal mass     Marfan's syndrome     Menopausal syndrome (hot flashes)     Moderate intellectual disabilities     Neural hearing loss, bilateral     Neurogenic bladder     Obesity     Primary osteoarthritis of right shoulder     SAH (subarachnoid hemorrhage) (H)     Psychosis (H)     Sleep apnea     S/p nephrectomy     Progress on goals from last visit:   1. Drink 6 cups of water per day- goal met- patient is drinking at least a liter of sparkling water per day along with a couple glasses of water    Dietary Recall: patient eats meals in her home- has meals prepared for her  Breakfast: pancakes with bananas and milk   Lunch: pasta with protein   Dinner: salad, protein, pasta  Snacks: once in a while- pretzels  Eating out (frequency/week): 2-3 times per week   Hydration (type/oz. per day):  Water: 6 cups of water per day  Exercise:  Routine exercise established: Yes  Gym 4 days per week     Nutrition Diagnosis:    (NI-5.7.1) Inadequate protein intake related to Food and nutrition related  knowledge deficit concerning appropriate amount of protein as evidenced by Estimated intake of protein insufficient to meet requirements    Intervention:  1. Recommend calorie/nutrient modification    Implementation:  1. Reviewed progress with previous goals  2. Reviewed meal planning    Monitoring/Evaluation:    Goals:  1. Go to the gym 5 days per week or walk up and down ramp an additional day outside of the gym  2. Limit starches and pasta to 1/2 cup per meal    Follow up:  Pt will follow up next week with bariatrician and PRN with dietitian depending on insurance coverage    Time spent with patient: 30 minutes  Sarah Sprague RD     ABN signed: Yes

## 2021-05-31 NOTE — PROGRESS NOTES
[facility] Non Surgical Follow up Visit   Date of visit: 8/20/2019  Physician: Lucia Martell MD  Primary Care is Vandana Boothe MD.  Deepthi Gomez   64 y.o.  female    Initial Weight: 251 pounds  Initial BMI: 39.31  Today's Weight:   Wt Readings from Last 1 Encounters:   08/20/19 214 lb (97.1 kg)     Body mass index is 33.52 kg/m .  Initial Weight: 251 lbs  Weight: 214 lb (97.1 kg)  Weight loss from initial: 35.9  % Weight loss: 14.3 %       Assessment and Plan   Assessment: Deepthi is a 64 y.o. year old female who presents for medical weight management.      Plan:    1. Obesity, Class I, BMI 30.0-34.9 (see actual BMI)  Patient was congratulated on her success thus far. Healthy habits to assist with further weight loss were discussed. Written information was given.     2. Pre-diabetes  This should improve with weight loss.       Follow up in 1 month with the dietician and in 3 months with myself           INTERIM HISTORY  Patient is settling in well at her new she is accompanied today with a staff member who prepares her evening meals.  She has been happy with the food she has been eating it has been helpful for her weight loss.    DIETARY HISTORY  Meals Per Day: 3  Eating Protein First?:  Usually  Food Diary: B:eggs, bananas, yogurt, milk L:skipped today because ride was late D:soup  Snacks Per Day: occasional  Typical Snack: pretzels  Fluid Intake: 64 oz  Portion Control: yes  Calorie Containing Beverages: only when she goes  Typical Protein Food Choices: Meat, yogurt, milk, eggs  Choosing Whole Grains: Usually  Meals at Restaurant per week:1      Positive Changes Since Last Visit: Protein first, water intake, exercise  Struggling With: None  Knowledgeable in Reading Food Labels: No but staff is helpful for her  Getting Adequate Protein: Yes  Sleeping 7-8 hours/day usually  Stress management not discussed    PHYSICAL ACTIVITY PATTERNS:  Cardiovascular: bikes at the [facility] 4-5 days per  week  Strength Training: None    REVIEW OF SYSTEMS  GENERAL/CONSTITUTIONAL:  Fatigue: No  HEENT:  Vision changes, glaucoma: No  CARDIOVASCULAR:  Chest Pain with Exertion: No  PULMONARY:  Dyspnea on exertion: Yes  NEUROLOGIC:  Paresthesias: No  PSYCHIATRIC:  Moods: stable  MUSCULOSKELETAL/RHEUMATOLOGIC  Arthralgias: Some knee pain  Myalgias: no  ENDOCRINE:  Monitoring Blood Sugars: na  Sugars Well Controlled: na       Patient Profile   Social History     Social History Narrative     Not on file        Past Medical History   Past Medical History:   Diagnosis Date     Adenomatous polyp of colon      Benign neoplasm of colon      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)      Depressive disorder      Diverticula of colon      DJD (degenerative joint disease)      LUC (generalized anxiety disorder)      Irritable bowel syndrome      Lateral epicondylitis      Left renal mass      Marfan's syndrome      Menopausal syndrome      MR (mental retardation)      Neural hearing loss      Neurogenic bladder      Obesity      Osteoarthritis of right shoulder      Psychosis (H)      S/p nephrectomy      SAH (subarachnoid hemorrhage) (H)      Sleep apnea     uses CPAP     Patient Active Problem List   Diagnosis     Status post total shoulder arthroplasty, right     Adenomatous polyp of colon     Atypical depression     Closed fracture of ankle     Contusion, knee     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)     Diverticulitis of colon     LUC (generalized anxiety disorder)     Irritable bowel syndrome     Lateral epicondylitis     Left renal mass     Marfan's syndrome     Menopausal syndrome (hot flashes)     Moderate intellectual disabilities     Neural hearing loss, bilateral     Neurogenic bladder     Obesity     Primary osteoarthritis of right shoulder     SAH (subarachnoid hemorrhage) (H)     Psychosis (H)     Sleep apnea     S/p nephrectomy     Current Outpatient Medications   Medication Sig Note     acetaminophen-codeine  "(TYLENOL #3) 300-30 mg per tablet Take 1-2 tablets by mouth every 6 (six) hours.      atenolol (TENORMIN) 25 MG tablet Take 25 mg by mouth daily.      calcium polycarbophil (FIBERCON) 625 mg tablet Take 1,250 mg by mouth every evening.      clindamycin (CLEOCIN) 300 MG capsule Take 300 mg by mouth.      DULoxetine (CYMBALTA) 60 MG capsule Take 120 mg by mouth daily. Take two capsules daily       gabapentin (NEURONTIN) 100 MG capsule Take 100 mg by mouth 3 (three) times a day.      loperamide (IMODIUM) 2 mg capsule Take 2 mg by mouth.      melatonin 3 mg Tab tablet Take 6 mg by mouth.      menthol (ICY HOT, MENTHOL,) 5 % PtMd Apply 1 patch topically daily as needed (For Shoulde and knee pain).      metroNIDAZOLE (METROCREAM) 0.75 % cream Apply 1 application topically at bedtime. Apply to face. 4/11/2017: Apply to face     multivitamin with minerals (THERA-M) 9 mg iron-400 mcg Tab tablet Take 1 tablet by mouth daily.      omega-3 acid ethyl esters (LOVAZA) 1 gram capsule Take 1,000 mg by mouth.      OMEGA-3/DHA/EPA/FISH OIL (FISH OIL-OMEGA-3 FATTY ACIDS) 300-1,000 mg capsule Take 2 g by mouth every evening.       oxybutynin (DITROPAN XL) 10 MG ER tablet Take 10 mg by mouth daily.      ranitidine (ZANTAC) 150 MG tablet Take 150 mg by mouth 2 (two) times a day.      risperiDONE (RISPERDAL) 4 MG tablet Take 4 mg by mouth 2 (two) times a day.      senna-docusate (SENNOSIDES-DOCUSATE SODIUM) 8.6-50 mg tablet Take 1-2 tablets by mouth daily.      sodium chloride (OCEAN) 0.65 % nasal spray 1 spray into each nostril 2 (two) times a day.         Past Surgical History  She has a past surgical history that includes Hysterectomy; Cerebral aneurysm repair; Nephrectomy (2013); and pr reconstr total shoulder implant (Right, 4/11/2017).     Examination   /70   Pulse (!) 54   Resp 14   Ht 5' 7\" (1.702 m)   Wt 214 lb (97.1 kg)   SpO2 93%   BMI 33.52 kg/m    Height: 5' 7\" (1.702 m) (8/20/2019  2:52 PM)  Initial Weight: 251 " lbs (8/16/2019 10:00 AM)  Weight: 214 lb (97.1 kg) (8/20/2019  2:52 PM)  Weight loss from initial: 35.9 (8/16/2019 10:00 AM)  % Weight loss: 14.3 % (8/16/2019 10:00 AM)  BMI (Calculated): 33.5 (8/20/2019  2:52 PM)  SpO2: 93 % (8/20/2019  2:52 PM)    General:  Alert and ambulatory, NAD  HEENT: Moist mucous membranes, neck is without LAD  Pulmonary:  Normal respiratory effort, no cough, no audible wheezes/crackles.  CV:  Regular rate and Rhythm, no murmurs  Pscyh/Mood: stable         Counseling:   We reviewed the important post op bariatric recommendations:  -eating 3 meals daily  -eating protein first, getting >60gm protein daily  -eating slowly, chewing food well  -avoiding/limiting calorie containing beverages  -limiting starchy vegetables and carbohydrates, choosing wheat, not white with breads,   crackers, pastas, mikhail, bagels, tortillas, rice  -limiting restaurant or cafeteria eating to twice a week or less    We discussed the importance of restorative sleep and stress management in maintaining a healthy weight.  We discussed the National Weight Control Registry healthy weight maintenance strategies and ways to optimize metabolism.  We discussed the importance of physical activity including cardiovascular and strength training in maintaining a healthier weight.    > 25 min spent with patient, > 50% spent in counseling         MING Martell MD  [facility].    Much or all of the text in this note was generated through the use of Dragon Dictate voice-to-text software. Errors in spelling or words which seem out of context are unintentional. Sound alike errors, in particular, may have escaped editing.

## 2021-06-02 ENCOUNTER — RECORDS - HEALTHEAST (OUTPATIENT)
Dept: ADMINISTRATIVE | Facility: CLINIC | Age: 66
End: 2021-06-02

## 2021-06-02 VITALS — WEIGHT: 220.4 LBS | BODY MASS INDEX: 34.59 KG/M2 | HEIGHT: 67 IN

## 2021-06-02 VITALS — WEIGHT: 222.9 LBS | BODY MASS INDEX: 34.99 KG/M2 | HEIGHT: 67 IN

## 2021-06-02 VITALS — BODY MASS INDEX: 39.39 KG/M2 | WEIGHT: 251 LBS | HEIGHT: 67 IN

## 2021-06-03 VITALS — HEIGHT: 67 IN | BODY MASS INDEX: 33.59 KG/M2 | WEIGHT: 214 LBS

## 2021-06-03 VITALS — HEIGHT: 67 IN | WEIGHT: 215.9 LBS | BODY MASS INDEX: 33.89 KG/M2

## 2021-06-03 VITALS — BODY MASS INDEX: 33.76 KG/M2 | WEIGHT: 215.1 LBS | HEIGHT: 67 IN

## 2021-06-04 VITALS — HEIGHT: 68 IN | WEIGHT: 190 LBS | BODY MASS INDEX: 28.79 KG/M2

## 2021-06-10 NOTE — ANESTHESIA POSTPROCEDURE EVALUATION
Patient: Deepthi Gomez  RIGHT TOTAL SHOULDER ARTHROPLASTY  Anesthesia type: general    Patient location: PACU  Last vitals:   Vitals:    04/11/17 1735   BP: 122/58   Pulse: 64   Resp: 16   Temp:    SpO2: 93%     Post vital signs: stable  Level of consciousness: awake and responds to simple questions  Post-anesthesia pain: pain controlled  Post-anesthesia nausea and vomiting: no  Pulmonary: unassisted, return to baseline  Cardiovascular: stable and blood pressure at baseline  Hydration: adequate  Anesthetic events: no    QCDR Measures:  ASA# 11 - Roopa-op Cardiac Arrest: ASA11B - Patient did NOT experience unanticipated cardiac arrest  ASA# 12 - Roopa-op Mortality Rate: ASA12B - Patient did NOT die  ASA# 13 - PACU Re-Intubation Rate: ASA13B - Patient did NOT require a new airway mgmt  ASA# 10 - Composite Anes Safety: ASA10A - No serious adverse event  ASA# 38 - New Corneal Injury: ASA38A - No new exposure keratitis or corneal abrasion in PACU    Additional Notes:

## 2021-06-10 NOTE — ANESTHESIA PREPROCEDURE EVALUATION
Anesthesia Evaluation      History of anesthetic complications     Airway   Mallampati: I  Neck ROM: full   Pulmonary - normal exam   (+) sleep apnea on CPAP, ,                          Cardiovascular   Rhythm: regular  Rate: normal,         Neuro/Psych      Comments: Neurogenic bladder    Endo/Other       Comments: Marfan Disease  Developmental delay    GI/Hepatic/Renal    (+)   chronic renal disease,     Comments: Nephrectomy : single kidney          Dental      Comment: Denies loose teeth                       Anesthesia Plan  Planned anesthetic: general tracheostomy and peripheral nerve block  Interscalene block for post op pain per surgeon request  ASA 3     Anesthetic plan and risks discussed with: patient and parent/guardian

## 2021-06-10 NOTE — ANESTHESIA CARE TRANSFER NOTE
Last vitals:   Vitals:    04/11/17 1645   BP: 127/59   Pulse: 72   Resp: 12   Temp: 36.6  C (97.8  F)   SpO2: 93%     Patient's level of consciousness is awake  Spontaneous respirations: yes  Maintains airway independently: yes  Dentition unchanged: yes  Oropharynx: oropharynx clear of all foreign objects    QCDR Measures:  ASA# 20 - Surgical Safety Checklist: ASA20A - Safety Checks Done  PQRS# 430 - Adult PONV Prevention: 4558F - Pt received => 2 anti-emetic agents (different classes) preop & intraop  ASA# 8 - Peds PONV Prevention: NA - Not pediatric patient, not GA or 2 or more risk factors NOT present  PQRS# 424 - Roopa-op Temp Management: 4559F - At least one body temp DOCUMENTED => 35.5C or 95.9F within required timeframe  PQRS# 426 - PACU Transfer Protocol: - Transfer of care checklist used  ASA# 14 - Acute Post-op Pain: ASA14B - Patient did NOT experience pain >= 7 out of 10 Report given.  Vital signs stable.  Denies pain/discomfort, a/o x 3.  Care transferred to RN      I completed my SBAR handoff to the receiving nurse per policy and procedure.

## 2021-06-10 NOTE — ANESTHESIA PROCEDURE NOTES
Peripheral Block    Patient location during procedure: pre-op  Start time: 4/11/2017 1:50 PM  End time: 4/11/2017 1:55 PM  post-op analgesia per surgeon order as noted in medical record  Staffing:  Performing  Anesthesiologist: JOE MCCARTHY  Preanesthetic Checklist  Completed: patient identified, site marked, risks, benefits, and alternatives discussed, timeout performed, consent obtained, at patient's request, airway assessed, oxygen available, suction available, emergency drugs available and hand hygiene performed  Peripheral Block  Block type: brachial plexus  Prep: ChloraPrep  Patient position: supine  Patient monitoring: continuous pulse oximetry, heart rate, blood pressure and cardiac monitor  Laterality: right  Injection technique: ultrasound guided    Ultrasound used to visualize needle placement in proximity to nerve being blocked: yes   Permanent ultrasound image captured for medical record    Needle  Needle type: short-bevel   Needle gauge: 20G  Needle length: 4 in    Assessment  Injection assessment: no difficulty with injection, negative aspiration for heme, no paresthesia on injection and incremental injection

## 2021-06-11 NOTE — ANESTHESIA PROCEDURE NOTES
Peripheral Block    Patient location during procedure: pre-op  Start time: 9/22/2020 11:00 AM  End time: 9/22/2020 11:07 AM  post-op analgesia per surgeon order as noted in medical record  Staffing:  Performing  Anesthesiologist: Brian Zambrano MD  Preanesthetic Checklist  Completed: patient identified, site marked, risks, benefits, and alternatives discussed, timeout performed, consent obtained, airway assessed, oxygen available, suction available, emergency drugs available and hand hygiene performed  Peripheral Block  Block type: saphenous, adductor canal block  Prep: ChloraPrep  Patient position: supine  Patient monitoring: blood pressure, heart rate and continuous pulse oximetry  Laterality: right  Injection technique: ultrasound guided    Ultrasound used to visualize needle placement in proximity to nerve being blocked: yes   US used to visualize anesthetic spread  Visualized anatomic structures normal  No Pathological Findings  Permanent ultrasound image captured for medical record  Sterile gel and probe cover used for ultrasound.  Needle  Needle type: Stimuplex   Needle gauge: 20G  Needle length: 6 in  no peripheral nerve catheter placed  Assessment  Injection assessment: no difficulty with injection, negative aspiration for heme, no paresthesia on injection and incremental injection

## 2021-06-11 NOTE — ANESTHESIA PREPROCEDURE EVALUATION
"Anesthesia Evaluation      History of anesthetic complications     Airway   Mallampati: I  Neck ROM: full   Pulmonary - normal exam   (+) sleep apnea,     ROS comment: Previous GETA caused \"systems to shut down\"                         Cardiovascular   Exercise tolerance: > or = 4 METS  (+) hypertension well controlled, dysrhythmias, ,     ECG reviewed  Rhythm: regular  Rate: normal,      ROS comment: marfans disease. Ascending aorta 3.8cm  EF 60%  Bradycardia 40-43     Neuro/Psych    (+) neuromuscular disease,      Comments: Neurogenic bladder  Marfans disease  Decreased mental capacity. Presents with her sister (guardian)    Endo/Other    (+) arthritis,      Comments: Marfan Disease  Developmental delay    GI/Hepatic/Renal    (+)   chronic renal disease,     Comments: Nephrectomy : single kidney          Dental - normal exam     Comment: Denies loose teeth                         Anesthesia Plan  Planned anesthetic: peripheral nerve block and spinal  Discussed rare risks of bleeding, infection, nerve damage, failure and LAST. We discussed risks of headache, failure and low blood pressure. Pt wishes to proceed  Plan for AC nerve block PSR for POPC  ASA 3     Anesthetic plan and risks discussed with: patient, healthcare power of  and sibling    Post-op plan: routine recovery          "

## 2021-06-11 NOTE — ANESTHESIA POSTPROCEDURE EVALUATION
Patient: Deepthi Gomez  Procedure(s):  RIGHT TOTAL KNEE ARTHROPLASTY (Right)  Anesthesia type: spinal    Patient location: PACU  Last vitals:   Vitals Value Taken Time   /66 9/22/2020  3:45 PM   Temp 36.3  C (97.4  F) 9/22/2020  3:45 PM   Pulse 58 9/22/2020  3:45 PM   Resp 18 9/22/2020  3:45 PM   SpO2 97 % 9/22/2020  3:45 PM     Post vital signs: stable  Level of consciousness: awake and responds to simple questions  Post-anesthesia pain: pain controlled  Post-anesthesia nausea and vomiting: no  Pulmonary: unassisted, return to baseline  Cardiovascular: stable and blood pressure at baseline  Hydration: adequate  Anesthetic events: no    QCDR Measures:  ASA# 11 - Roopa-op Cardiac Arrest: ASA11B - Patient did NOT experience unanticipated cardiac arrest  ASA# 12 - Roopa-op Mortality Rate: ASA12B - Patient did NOT die  ASA# 13 - PACU Re-Intubation Rate: NA - No ETT / LMA used for case  ASA# 10 - Composite Anes Safety: ASA10A - No serious adverse event    Additional Notes:

## 2021-06-11 NOTE — ANESTHESIA PROCEDURE NOTES
Spinal Block    Patient location during procedure: OR  Start time: 9/22/2020 12:16 PM  End time: 9/22/2020 12:19 PM  Reason for block: primary anesthetic    Staffing:  Performing  Anesthesiologist: Brian Zambrano MD    Preanesthetic Checklist  Completed: patient identified, risks, benefits, and alternatives discussed, timeout performed, consent obtained, airway assessed, oxygen available, suction available, emergency drugs available and hand hygiene performed  Spinal Block  Patient position: sitting  Prep: ChloraPrep  Patient monitoring: heart rate, cardiac monitor, continuous pulse ox and blood pressure  Approach: midline  Location: L3-4  Injection technique: single-shot  Needle type: pencil-tip   Needle gauge: 24 G

## 2021-06-11 NOTE — ANESTHESIA CARE TRANSFER NOTE
Last vitals:   Vitals:    09/22/20 1415   BP: 128/56   Pulse: 66   Resp: 12   Temp: 37.4  C (99.4  F)   SpO2: 93%     Patient's level of consciousness is awake  Spontaneous respirations: yes  Maintains airway independently: yes  Dentition unchanged: yes  Oropharynx: oropharynx clear of all foreign objects    QCDR Measures:  ASA# 20 - Surgical Safety Checklist: WHO surgical safety checklist completed prior to induction    PQRS# 430 - Adult PONV Prevention: NA - Not adult patient, not GA or 3 or more risk factors NOT present  ASA# 8 - Peds PONV Prevention: NA - Not pediatric patient, not GA or 2 or more risk factors NOT present  PQRS# 424 - Roopa-op Temp Management: 4559F - At least one body temp DOCUMENTED => 35.5C or 95.9F within required timeframe  PQRS# 426 - PACU Transfer Protocol: - Transfer of care checklist used  ASA# 14 - Acute Post-op Pain: ASA14B - Patient did NOT experience pain >= 7 out of 10

## 2021-06-15 PROBLEM — Z96.611 STATUS POST TOTAL SHOULDER ARTHROPLASTY, RIGHT: Status: ACTIVE | Noted: 2017-04-11

## 2021-06-16 PROBLEM — F32.89 ATYPICAL DEPRESSION: Status: ACTIVE | Noted: 2017-07-17

## 2021-06-16 PROBLEM — Z96.651 S/P TOTAL KNEE ARTHROPLASTY, RIGHT: Status: ACTIVE | Noted: 2020-09-22

## 2021-06-16 PROBLEM — F71 MODERATE INTELLECTUAL DISABILITIES: Status: ACTIVE | Noted: 2018-06-04

## 2021-06-24 NOTE — PROGRESS NOTES
HPI:  Deepthi Gomez is a 63 y.o. year old female with weight related co-morbidities of   Patient Active Problem List   Diagnosis     Status post total shoulder arthroplasty, right     Adenomatous polyp of colon     Atypical depression     Closed fracture of ankle     Contusion, knee     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)     Diverticulitis of colon     LUC (generalized anxiety disorder)     Irritable bowel syndrome     Lateral epicondylitis     Left renal mass     Marfan's syndrome     Menopausal syndrome (hot flashes)     Moderate intellectual disabilities     Neural hearing loss, bilateral     Neurogenic bladder     Obesity     Primary osteoarthritis of right shoulder     SAH (subarachnoid hemorrhage) (H)     Psychosis (H)     Sleep apnea     S/p nephrectomy    who presents for medical bariatric consultation in the setting of weight related co-morbidities.  Her BMI is Body mass index is 39.31 kg/m ..      Assessment: Deepthi is a 63 y.o. year old female who presents for medical weight management. She lives in a group home.      Plan:    1. Obesity  We discussed healthy habits to assist with weight loss. She will start planning and packing her meals. She will be moving to a new group home in a few weeks. Her sister will help her with meal planning but they will also try to get a staff member from the group home to accompany her to her visit with the dietician. We discussed medication that may assist with weight loss. At this point we opted not to start anything. With her CV risk associated with Marfan's I am hesitant to use phentermine.    2. Pre-diabetes  We discussed insulin resistance and how it can interfere with weight loss efforts. We discussed dietary changes to reduce insulin resistance. We discussed the use of metformin. Patient and her sister had their questions answered. They will hold off for now. She had one kidney removed because of a benign mass. Her recent GFR was normal.    Follow up: in 1  month with the dietician and in 2 months with myself      >45 minutes spent with patient, > 50% spent in counseling          Counseling:  We discussed [facility] Bariatric Basics including:  -eating 3 meals daily  -eating protein first  -eating slowly, chewing food well  -avoiding/limiting calorie containing beverages  -choosing wheat, not white with breads, crackers, pastas, mikhail, bagels, tortillas, rice  -limiting carbohydrates in general  -limiting restaurant or cafeteria eating to twice a week or less    We discussed the importance of restorative sleep and stress management in maintaining a healthy weight.    We reviewed medications associated with weight gain.    We discussed insulin resistance and glycemic index as it relates to appetite and weight control.     We discussed the National Weight Control Registry healthy weight maintenance strategies and ways to optimize metabolism.  We discussed the importance of physical activity including cardiovascular and strength training in maintaining a healthier weight and explored viable options.    We discussed medications available for weight loss including phentermine, phendimetrazine, topamax, qsymia, lorcaserin, contrave, diethylproprion, and orlistat. We discussed the risks and benefits of each. We discussed indications, contraindications, potential side effects, and estimated costs of each. Literature was offered.    Dietary History  Pt lives in a group home. Her sister is trying to get her to bring her breakfast and lunch to work. She has little input in the evening meal.   Meals per day: 1-3  Snacks: varies  Typical Snack: vending machine at work, occasional fresh fruit  Who does the grocery shopping? Group home staff  Who does the cooking? Group home staff  A typical meal includes: B: 1 egg and a piece of fruit  L: egg or yogurt or piece of fruit (was vending machine candy previously) D: pasta and meat and veggies  Regular Pop: stopped recently  Juice: 10-20  oz per day  Caffeine: tries not to drink any  Amount of restaurant eating per week: < 1 x per week  Eating a the table with the TV off? Not discussed    Physical Activity Patterns  Current physical activity routine includes: 30 min 4 days on bike    Limitations from being physically active on a regular basis includes: weight training 2 x per week, working with         PMH:  Past Medical History:   Diagnosis Date     Adenomatous polyp of colon      Benign neoplasm of colon      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)      Depressive disorder      Diverticula of colon      DJD (degenerative joint disease)      LUC (generalized anxiety disorder)      Irritable bowel syndrome      Lateral epicondylitis      Left renal mass      Marfan's syndrome      Menopausal syndrome      MR (mental retardation)      Neural hearing loss      Neurogenic bladder      Obesity      Osteoarthritis of right shoulder      Psychosis (H)      S/p nephrectomy      SAH (subarachnoid hemorrhage) (H)      Sleep apnea     uses CPAP       Past Surgical Hx:  Past Surgical History:   Procedure Laterality Date     CEREBRAL ANEURYSM REPAIR       HYSTERECTOMY       NEPHRECTOMY  2013     DE RECONSTR TOTAL SHOULDER IMPLANT Right 4/11/2017    Procedure: RIGHT TOTAL SHOULDER ARTHROPLASTY;  Surgeon: Salvador Miller MD;  Location: [facility];  Service: Orthopedics       Medication:  Current Outpatient Medications on File Prior to Visit   Medication Sig Dispense Refill     loperamide (IMODIUM) 2 mg capsule Take 2 mg by mouth.       melatonin 3 mg Tab tablet Take 6 mg by mouth.       omega-3 acid ethyl esters (LOVAZA) 1 gram capsule Take 1,000 mg by mouth.       acetaminophen-codeine (TYLENOL #3) 300-30 mg per tablet Take 1-2 tablets by mouth every 6 (six) hours. 50 tablet 0     atenolol (TENORMIN) 25 MG tablet Take 25 mg by mouth daily.       calcium polycarbophil (FIBERCON) 625 mg tablet Take 1,250 mg by mouth every evening.        clindamycin (CLEOCIN) 300 MG capsule Take 300 mg by mouth.       DULoxetine (CYMBALTA) 60 MG capsule Take 120 mg by mouth daily. Take two capsules daily        gabapentin (NEURONTIN) 100 MG capsule Take 100 mg by mouth 3 (three) times a day.       menthol (ICY HOT, MENTHOL,) 5 % PtMd Apply 1 patch topically daily as needed (For Shoulde and knee pain).       metroNIDAZOLE (METROCREAM) 0.75 % cream Apply 1 application topically at bedtime. Apply to face.       multivitamin with minerals (THERA-M) 9 mg iron-400 mcg Tab tablet Take 1 tablet by mouth daily.       OMEGA-3/DHA/EPA/FISH OIL (FISH OIL-OMEGA-3 FATTY ACIDS) 300-1,000 mg capsule Take 2 g by mouth every evening.        oxybutynin (DITROPAN XL) 10 MG ER tablet Take 10 mg by mouth daily.       ranitidine (ZANTAC) 150 MG tablet Take 150 mg by mouth 2 (two) times a day.       risperiDONE (RISPERDAL) 4 MG tablet Take 4 mg by mouth 2 (two) times a day.       senna-docusate (SENNOSIDES-DOCUSATE SODIUM) 8.6-50 mg tablet Take 1-2 tablets by mouth daily. 30 tablet 0     sodium chloride (OCEAN) 0.65 % nasal spray 1 spray into each nostril 2 (two) times a day.        No current facility-administered medications on file prior to visit.        Allergies:Sulfa (sulfonamide antibiotics) and Penicillins    Family Hx:No family history on file.    Social Hx:  Social History     Socioeconomic History     Marital status: Single     Spouse name: Not on file     Number of children: Not on file     Years of education: Not on file     Highest education level: Not on file   Occupational History     Not on file   Social Needs     Financial resource strain: Not on file     Food insecurity:     Worry: Not on file     Inability: Not on file     Transportation needs:     Medical: Not on file     Non-medical: Not on file   Tobacco Use     Smoking status: Former Smoker     Smokeless tobacco: Never Used     Tobacco comment: quit years ago   Substance and Sexual Activity     Alcohol use: No      "Drug use: No     Sexual activity: Not on file   Lifestyle     Physical activity:     Days per week: Not on file     Minutes per session: Not on file     Stress: Not on file   Relationships     Social connections:     Talks on phone: Not on file     Gets together: Not on file     Attends Mu-ism service: Not on file     Active member of club or organization: Not on file     Attends meetings of clubs or organizations: Not on file     Relationship status: Not on file     Intimate partner violence:     Fear of current or ex partner: Not on file     Emotionally abused: Not on file     Physically abused: Not on file     Forced sexual activity: Not on file   Other Topics Concern     Not on file   Social History Narrative     Not on file       Tobacco Use Hx:  Social History     Tobacco Use   Smoking Status Former Smoker   Smokeless Tobacco Never Used   Tobacco Comment    quit years ago       ROS  General  Fatigue: yes  Sleep Quality:poor- recently started using CPAP again, sleeps 12 hours per night, has some insomnia issues  HEENT  Hx of glaucoma: no  Vision changes: no  Cardiovascular  Chest Pain with Exertion: no  Palpitations: no  Hx of heart disease: no  Pulmonary  Shortness of breath at rest: no  Shortness of breath with exertion: no  Snoring: yes  Stop-bang score: known sleep apnea  [place] Score: na  Gastrointestinal  Heartburn: no  Abdominal pain: no  Psychiatric  Moods Stable: yes  Endocrine  Polydipsia: yes  Polyuria: yes  Neurologic:  Hx of seizures: yes- hx of brain aneurism  Dermatologic  Rashes: none      /64   Pulse 63   Resp 18   Ht 5' 7\" (1.702 m)   Wt (!) 251 lb (113.9 kg) Comment: with jacket and boots on  SpO2 94%   BMI 39.31 kg/m    Wt Readings from Last 2 Encounters:   03/12/19 (!) 251 lb (113.9 kg)   04/14/17 (!) 246 lb (111.6 kg)     Body mass index is 39.31 kg/m .  Neck circumference/Waist Circumference: Height: 5' 7\" (1.702 m) (3/12/2019  1:09 PM)  Initial Weight: 251 lbs (3/12/2019  " 1:09 PM)  Weight: (!) 251 lb (113.9 kg) (with jacket and boots on) (3/12/2019  1:09 PM)  Weight loss from initial: 0 (3/12/2019  1:09 PM)  % Weight loss: 0 % (3/12/2019  1:09 PM)  BMI (Calculated): 39.3 (3/12/2019  1:09 PM)  SpO2: 94 % (3/12/2019  1:09 PM)        Physical Exam:    GEN: Alert and oriented in no acute distress.   HEENT: PERRLA, mucous membranes moist. Airway adequate  NECK: Supple without LAD or thyromegaly.   LUNGS: CTA without wheezes or crackles, good air movement throughout  CV: RRR no MRG  ABDOMEN: moderate protuberance, BS normal, non tender to palpation, no rebound or guarding,       Labs:    Lab Results   Component Value Date    WBC 6.8 04/19/2017    HGB 11.3 (L) 04/19/2017    HCT 35.2 04/19/2017    MCV 96 04/19/2017     04/19/2017     No results found for: CHOL  No results found for: HDL  No results found for: LDLCALC  No results found for: TRIG  No components found for: CHOLHDL  No results found for: ALT, AST, GGT, ALKPHOS, BILITOT  No results found for: HGBA1C  No results found for: [id number]    Much or all of the text in this note was generated through the use of Dragon Dictate voice-to-text software. Errors in spelling or words which seem out of context are unintentional. Sound alike errors, in particular, may have escaped editing.

## 2023-06-07 RX ORDER — MIRABEGRON 50 MG/1
50 TABLET, EXTENDED RELEASE ORAL EVERY MORNING
COMMUNITY

## 2023-06-07 RX ORDER — LANOLIN ALCOHOL/MO/W.PET/CERES
6 CREAM (GRAM) TOPICAL AT BEDTIME
COMMUNITY

## 2023-06-07 RX ORDER — LIDOCAINE/PRILOCAINE 2.5 %-2.5%
CREAM (GRAM) TOPICAL PRN
COMMUNITY

## 2023-06-07 RX ORDER — NICOTINE POLACRILEX 4 MG/1
20 GUM, CHEWING ORAL
COMMUNITY

## 2023-06-07 RX ORDER — LOPERAMIDE HCL 2 MG
2 CAPSULE ORAL 2 TIMES DAILY PRN
COMMUNITY

## 2023-07-18 NOTE — PROGRESS NOTES
Discharge plan according to [facility]:       06/07/23 1203   Discharge Planning   Patient/Family Anticipates Transition to group home  ([facility])   Living Arrangements   People in Home other (see comments);sibling(s)  (group home has full time care givers and sister- Nhung lives near by)   Type of Residence Other (Comment)  (group home)   Is your private residence a single family home or apartment? Single family home   Number of Stairs, Within Home, Primary none   Stair Railings, Within Home, Primary none   Bathroom Shower/Tub Walk-in shower   Equipment Currently Used at Home raised toilet seat;cane, straight;walker, rolling   Support System   Support Systems Home Care Staff;Family Members   Do you have someone available to stay with you one or two nights once you are home? Yes

## 2023-07-25 ENCOUNTER — ANESTHESIA EVENT (OUTPATIENT)
Dept: SURGERY | Facility: CLINIC | Age: 68
End: 2023-07-25
Payer: COMMERCIAL

## 2023-07-25 NOTE — TREATMENT PLAN
Orthopedic Surgery Pre-Op Plan: Deepthi Gomez  pre-op review. This is NOT an H&P   Surgeon: Dr. Miller   Hospital: [facility]  Name of Surgery: Left Total Knee Arthroplasty  Date of Surgery: 7/26/23  H&P: Completed on 7/19/23 by Dr. Vandana Boothe at [facility].   History of ASA, NSAIDS, vitamin and/or herbal supplements within 10 days: Yes- Fish Oil, Multivitamin, Aspirin-patient instructed to hold these medications/supplements/vitamins for 7 days before surgery.  History of blood thinners: No    Plan:   1) Discharge Plan: Home (plan is to return back to her Group Home- [facility]) on POD 1 or POD 2 when medically stable for discharge. Sister, Oly Lebron, [phone] is patient's guardian. Please see Discharge Planning section near bottom of this note for further details.     2) History of Hydrocephalus with Developmental Delay and Moderate Intellectual Disabilities: SisterOly, [phone] is patient's guardian. Lives in [facility] in [place], [place]. Plan is to return to Group Home after surgery when medically stable for discharge. Patient did well with right total knee arthroplasty surgery here at [facility] in Sept. 2020. Discharged back to group home on POD 2 following that surgery.  I recommend close monitoring and coordination with her sister Oly.     3) History of Brain Aneurysm: S/P Left Frontotemporal Craniotomy with Aneurysm Clip in 1982: Stable per most recent CT Head on 3/21/23:  No acute intracranial process.   Postop changes and left frontotemporal encephalomalacia.      4) Marfan Syndrome: Followed with regular Echocardiograms. Last Echo 7/21/22:   Summary    1. Normal sinus rhythm during study.     2. The left ventricular size is normal.  Systolic function is normal.    The   estimated ejection fraction is 55-60%.  Wall thickness is borderline   increased.  Left ventricular segmental wall motion is normal.     3.  Increased mid to apical LV trabeculation.  Patient declined contrast   use.   Cannot exclude component of noncompaction.     4. The right ventricular size is normal.  Systolic function is normal.     5. The left atrium is moderately enlarged.     6. The right atrium is borderline enlarged.     7. No significant valvular abnormalities.     8. The aortic root at the sinus of Valsalva is normal measuring 2.91 cm   with   an index of 1.37 cm/m2.     9. The proximal ascending aorta is normal measuring 3.14 cm with an index    of   1.48 cm/m2.     10. Mid ascending thoracic aorta diameter is normal measuring 3.5 cm.     11. Mid aortic arch is mildly dilated measuring 3.73 cm.  Distal aortic   arch   diameter is normal measuring 2.94 cm.     12. Compared to the prior study, there have been no significant changes.       5) Obstructive Sleep Apnea: on CPAP.  Patient reminded to bring CPAP machine to the hospital and use it whenever sleeping or napping.    6) History of Renal Cancer-S/P nephrectomy in 2013: Renal function normal at preop exam.  Creatinine 0.82, GFR >60 at preop exam on 7/19/2023.    7) Prediabetes: Hemoglobin A1c 5.7, , both on 7/19/2023. We will monitor BG's during hospital stay.  Goal BG <180 to decrease risk for infection and postop wound healing complications.  If BG >180, nursing to please notify Hospitalist.      8) Neurogenic Bladder and Chronic Incontinence: Follows with Urology.  Wears pull-ups.  On mirabegron and oxybutynin.  Did have postop urinary retention after right total knee replacement surgery here in 2020.  Required several straight caths and then placement of a Schmidt catheter. Discharged back to Group Home with Schmidt and followed up with Urology a few days later for voiding trial.  I recommend close monitoring for postop urinary retention with frequent bladder scanning as needed.     9) Irritable Bowel Syndrome: On Fibercon, loperamide prn for diarrhea.     10) History of Psychosis,  LUC, Depression: Follows with Psychiatry. On risperidone, duloxetine, gabapentin and melatonin.     Patient appears medically optimized for upcoming surgery. I would recommend Hospitalist Consult to assist with medical management. Please call me below with any questions on this patient.       Review of Systems Notable for: History of hydrocephalus with developmental delay and moderate intellectual disabilities-SisterOly is guardian, patient resides in group home, history of brain aneurysm-s/p left frontotemporal craniotomy and aneurysm clip in 1982, Marfan syndrome, obstructive sleep apnea-on CPAP, history of renal cancer-s/p nephrectomy in 2013, prediabetes, neurogenic bladder and chronic incontinence, irritable bowel syndrome, history of psychosis, LUC, depression.    Past Medical History:   Past Medical History:   Diagnosis Date    Adenomatous polyp of colon     Benign neoplasm of colon     Brain aneurysm     Bruxism     Cancer (H)     possible renal cancer    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)     Depressive disorder     Developmental delay     Diverticula of colon     DJD (degenerative joint disease)     LUC (generalized anxiety disorder)     Gastroesophageal reflux disease     Hydrocephalus (H)     Hypertension     Irritable bowel syndrome     Lateral epicondylitis     Left renal mass     Marfan's syndrome     Menopausal syndrome     MR (mental retardation)     Neural hearing loss     Neurogenic bladder     Obesity     Osteoarthritis of right shoulder     Psychosis (H)     S/p nephrectomy     SAH (subarachnoid hemorrhage) (H)     Seizures (H)     Sleep apnea     uses CPAP     Past Surgical History:   Procedure Laterality Date    CEREBRAL ANEURYSM REPAIR      CRANIOTOMY      HYSTERECTOMY      JOINT REPLACEMENT Right     TSA    NEPHRECTOMY  2013    [facility] RECONSTR TOTAL SHOULDER IMPLANT Right 4/11/2017    Procedure: RIGHT TOTAL SHOULDER ARTHROPLASTY;  Surgeon: Salvador Miller MD;  Location: [facility]  OR;  Service: Orthopedics    [facility] TOTAL KNEE ARTHROPLASTY Right 9/22/2020    Procedure: RIGHT TOTAL KNEE ARTHROPLASTY;  Surgeon: Salvador Miller MD;  Location: [facility] OR;  Service: Orthopedics       Current Medications:  Patient's Medications   New Prescriptions    No medications on file   Previous Medications    ACETAMINOPHEN (TYLENOL PO)    Take 325-650 mg by mouth every 6 hours as needed for mild pain or fever    ACETAMINOPHEN-CODEINE (TYLENOL #3) 300-30 MG PER TABLET    Take 1 tablet by mouth every 6 hours as needed for moderate pain     ASPIRIN PO    Take 325 mg by mouth 2 times daily    ATENOLOL PO    Take 25 mg by mouth    CALCIUM POLYCARBOPHIL (FIBERCON) 625 MG TABLET    Take 2 tablets by mouth daily    DULOXETINE HCL PO    Take 120 mg by mouth daily    GABAPENTIN PO    Take 100 mg by mouth 3 times daily    LIDOCAINE-PRILOCAINE (EMLA) 2.5-2.5 % EXTERNAL CREAM    Apply topically as needed for moderate pain    LOPERAMIDE (IMODIUM) 2 MG CAPSULE    Take 2 mg by mouth 4 times daily as needed for diarrhea    MELATONIN 3 MG TABLET    Take 3 mg by mouth nightly as needed for sleep    MENTHOL (ICY HOT) 5 % PTCH    Apply 1 patch topically every 8 hours as needed for muscle soreness    METRONIDAZOLE (METROCREAM) 0.75 % CREAM    Apply topically At Bedtime    MIRABEGRON (MYRBETRIQ) 50 MG 24 HR TABLET    Take 50 mg by mouth daily    MULTIVITAMIN, THERAPEUTIC WITH MINERALS (THERA-VIT-M) TABS TABLET    Take 1 tablet by mouth daily    OMEGA-3 FATTY ACIDS (FISH OIL OMEGA-3) 1000 MG CAPS    Take 2 capsules by mouth At Bedtime    OMEPRAZOLE 20 MG TABLET    Take 20 mg by mouth daily    OXYBUTYNIN (DITROPAN-XL) 10 MG 24 HR TABLET    Take 10 mg by mouth daily    OXYCODONE HCL PO    Take 2.5-5 mg by mouth every 4 hours as needed    RANITIDINE HCL PO    Take 150 mg by mouth 2 times daily    RISPERIDONE PO    Take 4 mg by mouth 2 times daily    SALINE (SODIUM CHLORIDE) 0.65 % SOLN    Spray 1 spray in nostril 2 times daily     SENNA-DOCUSATE (SENOKOT-S;PERICOLACE) 8.6-50 MG PER TABLET    Take 1 tablet by mouth daily   Modified Medications    No medications on file   Discontinued Medications    No medications on file       ALLERGIES:  Allergies   Allergen Reactions    Penicillins     Sulfa Antibiotics        Social History  Social History     Tobacco Use    Smoking status: Former    Smokeless tobacco: Never    Tobacco comments:     quit years ago   Substance Use Topics    Alcohol use: No    Drug use: No       Any Abnormal Recent Diagnostics? Yes  Hemoglobin A1c 5.7 on 7/19/2023: In prediabetes range.  Blood glucose 110 on 7/19/2023: We will monitor BG's during hospital stay.  Goal BG <180.     Discharge Planning:   Discharge plan according to [facility]:     Return Home to ([facility]) on POD 1 or  POD 2 when medically stable for discharge. Group Home has full time care-givers  and sister Nhung also lives nearby.  Patient discharged back to her Group Home  After her right total knee replacement surgery 9/2020.     06/07/23 1203   Discharge Planning   Patient/Family Anticipates Transition to group home  ([facility])   Living Arrangements   People in Home other (see comments);sibling(s)  (group home has full time care givers and sister- Nhung lives near by)   Type of Residence Other (Comment)  (group home)   Is your private residence a single family home or apartment? Single family home   Number of Stairs, Within Home, Primary none   Stair Railings, Within Home, Primary none   Bathroom Shower/Tub Walk-in shower   Equipment Currently Used at Home raised toilet seat;cane, straight;walker, rolling   Support System   Support Systems Home Care Staff;Family Members   Do you have someone available to stay with you one or two nights once you are home? Yes       ALESHA Medel, CNP   Advanced Practice Nurse Navigator- Orthopedics  [facility]   Phone: [phone]

## 2023-07-26 ENCOUNTER — APPOINTMENT (OUTPATIENT)
Dept: RADIOLOGY | Facility: CLINIC | Age: 68
End: 2023-07-26
Attending: PHYSICIAN ASSISTANT
Payer: COMMERCIAL

## 2023-07-26 ENCOUNTER — HOSPITAL ENCOUNTER (OUTPATIENT)
Facility: CLINIC | Age: 68
Discharge: NURSING, REHAB OR LONG-TERM CARE | End: 2023-07-28
Attending: ORTHOPAEDIC SURGERY | Admitting: ORTHOPAEDIC SURGERY
Payer: COMMERCIAL

## 2023-07-26 ENCOUNTER — ANESTHESIA (OUTPATIENT)
Dept: SURGERY | Facility: CLINIC | Age: 68
End: 2023-07-26
Payer: COMMERCIAL

## 2023-07-26 DIAGNOSIS — Z96.652 STATUS POST TOTAL LEFT KNEE REPLACEMENT: Primary | ICD-10-CM

## 2023-07-26 PROBLEM — Z96.659 S/P TOTAL KNEE ARTHROPLASTY: Status: ACTIVE | Noted: 2023-07-26

## 2023-07-26 PROBLEM — R62.50 DEVELOPMENTAL DELAY: Status: ACTIVE | Noted: 2023-07-26

## 2023-07-26 PROBLEM — F71 MODERATE INTELLECTUAL DISABILITIES: Chronic | Status: ACTIVE | Noted: 2018-06-04

## 2023-07-26 PROBLEM — Q03.9 CONGENITAL HYDROCEPHALUS (H): Status: ACTIVE | Noted: 2023-07-26

## 2023-07-26 PROBLEM — M17.12 LEFT KNEE DJD: Chronic | Status: ACTIVE | Noted: 2023-07-26

## 2023-07-26 PROBLEM — Z96.659 S/P TOTAL KNEE ARTHROPLASTY: Chronic | Status: ACTIVE | Noted: 2023-07-26

## 2023-07-26 PROBLEM — Z78.9 LIVES IN GROUP HOME: Chronic | Status: ACTIVE | Noted: 2023-07-26

## 2023-07-26 PROBLEM — Z78.9 LIVES IN GROUP HOME: Status: ACTIVE | Noted: 2023-07-26

## 2023-07-26 PROBLEM — M17.12 LEFT KNEE DJD: Status: ACTIVE | Noted: 2023-07-26

## 2023-07-26 LAB — GLUCOSE BLDC GLUCOMTR-MCNC: 121 MG/DL (ref 70–99)

## 2023-07-26 PROCEDURE — 258N000003 HC RX IP 258 OP 636: Performed by: NURSE ANESTHETIST, CERTIFIED REGISTERED

## 2023-07-26 PROCEDURE — 258N000003 HC RX IP 258 OP 636: Performed by: ANESTHESIOLOGY

## 2023-07-26 PROCEDURE — 250N000009 HC RX 250: Performed by: NURSE ANESTHETIST, CERTIFIED REGISTERED

## 2023-07-26 PROCEDURE — 250N000013 HC RX MED GY IP 250 OP 250 PS 637: Performed by: INTERNAL MEDICINE

## 2023-07-26 PROCEDURE — 360N000077 HC SURGERY LEVEL 4, PER MIN: Performed by: ORTHOPAEDIC SURGERY

## 2023-07-26 PROCEDURE — 250N000011 HC RX IP 250 OP 636: Performed by: ANESTHESIOLOGY

## 2023-07-26 PROCEDURE — 99233 SBSQ HOSP IP/OBS HIGH 50: CPT | Performed by: INTERNAL MEDICINE

## 2023-07-26 PROCEDURE — 250N000011 HC RX IP 250 OP 636: Mod: JZ | Performed by: NURSE ANESTHETIST, CERTIFIED REGISTERED

## 2023-07-26 PROCEDURE — C1776 JOINT DEVICE (IMPLANTABLE): HCPCS | Performed by: ORTHOPAEDIC SURGERY

## 2023-07-26 PROCEDURE — 999N000065 XR KNEE PORT LEFT 1/2 VIEWS: Mod: LT

## 2023-07-26 PROCEDURE — 710N000010 HC RECOVERY PHASE 1, LEVEL 2, PER MIN: Performed by: ORTHOPAEDIC SURGERY

## 2023-07-26 PROCEDURE — 370N000017 HC ANESTHESIA TECHNICAL FEE, PER MIN: Performed by: ORTHOPAEDIC SURGERY

## 2023-07-26 PROCEDURE — 999N000141 HC STATISTIC PRE-PROCEDURE NURSING ASSESSMENT: Performed by: ORTHOPAEDIC SURGERY

## 2023-07-26 PROCEDURE — 250N000013 HC RX MED GY IP 250 OP 250 PS 637: Performed by: HOSPITALIST

## 2023-07-26 PROCEDURE — 250N000009 HC RX 250: Performed by: PHYSICIAN ASSISTANT

## 2023-07-26 PROCEDURE — 250N000011 HC RX IP 250 OP 636: Mod: JZ | Performed by: PHYSICIAN ASSISTANT

## 2023-07-26 PROCEDURE — 250N000011 HC RX IP 250 OP 636: Performed by: PHYSICIAN ASSISTANT

## 2023-07-26 PROCEDURE — 999N000127 HC STATISTIC PERIPHERAL IV START W US GUIDANCE

## 2023-07-26 PROCEDURE — 258N000003 HC RX IP 258 OP 636: Performed by: PHYSICIAN ASSISTANT

## 2023-07-26 PROCEDURE — 250N000009 HC RX 250: Performed by: ORTHOPAEDIC SURGERY

## 2023-07-26 PROCEDURE — 999N000285 HC STATISTIC VASC ACCESS LAB DRAW WITH PIV START

## 2023-07-26 PROCEDURE — 250N000013 HC RX MED GY IP 250 OP 250 PS 637: Performed by: PHYSICIAN ASSISTANT

## 2023-07-26 PROCEDURE — C1713 ANCHOR/SCREW BN/BN,TIS/BN: HCPCS | Performed by: ORTHOPAEDIC SURGERY

## 2023-07-26 PROCEDURE — 272N000001 HC OR GENERAL SUPPLY STERILE: Performed by: ORTHOPAEDIC SURGERY

## 2023-07-26 DEVICE — CRUCIATE RETAINING FEMORAL
Type: IMPLANTABLE DEVICE | Site: KNEE | Status: FUNCTIONAL
Brand: TRIATHLON

## 2023-07-26 DEVICE — PATELLA
Type: IMPLANTABLE DEVICE | Site: KNEE | Status: FUNCTIONAL
Brand: TRIATHLON

## 2023-07-26 DEVICE — UNIVERSAL TIBIAL BASEPLATE
Type: IMPLANTABLE DEVICE | Site: KNEE | Status: FUNCTIONAL
Brand: TRIATHLON

## 2023-07-26 DEVICE — BONE CEMENT SIMPLEX FULL DOSE 6191-1-001: Type: IMPLANTABLE DEVICE | Site: KNEE | Status: FUNCTIONAL

## 2023-07-26 DEVICE — TIBIAL BEARING INSERT - CS
Type: IMPLANTABLE DEVICE | Site: KNEE | Status: FUNCTIONAL
Brand: TRIATHLON

## 2023-07-26 RX ORDER — PANTOPRAZOLE SODIUM 40 MG/1
40 TABLET, DELAYED RELEASE ORAL
Status: DISCONTINUED | OUTPATIENT
Start: 2023-07-27 | End: 2023-07-28 | Stop reason: HOSPADM

## 2023-07-26 RX ORDER — MIRABEGRON 25 MG/1
50 TABLET, FILM COATED, EXTENDED RELEASE ORAL EVERY MORNING
Status: DISCONTINUED | OUTPATIENT
Start: 2023-07-27 | End: 2023-07-28 | Stop reason: HOSPADM

## 2023-07-26 RX ORDER — RISPERIDONE 1 MG/1
4 TABLET ORAL DAILY
Status: DISCONTINUED | OUTPATIENT
Start: 2023-07-26 | End: 2023-07-28 | Stop reason: HOSPADM

## 2023-07-26 RX ORDER — FENTANYL CITRATE 50 UG/ML
25-100 INJECTION, SOLUTION INTRAMUSCULAR; INTRAVENOUS
Status: DISCONTINUED | OUTPATIENT
Start: 2023-07-26 | End: 2023-07-26 | Stop reason: HOSPADM

## 2023-07-26 RX ORDER — SODIUM CHLORIDE, SODIUM LACTATE, POTASSIUM CHLORIDE, CALCIUM CHLORIDE 600; 310; 30; 20 MG/100ML; MG/100ML; MG/100ML; MG/100ML
INJECTION, SOLUTION INTRAVENOUS CONTINUOUS
Status: DISCONTINUED | OUTPATIENT
Start: 2023-07-26 | End: 2023-07-26 | Stop reason: HOSPADM

## 2023-07-26 RX ORDER — FENTANYL CITRATE 50 UG/ML
50 INJECTION, SOLUTION INTRAMUSCULAR; INTRAVENOUS EVERY 5 MIN PRN
Status: DISCONTINUED | OUTPATIENT
Start: 2023-07-26 | End: 2023-07-26 | Stop reason: HOSPADM

## 2023-07-26 RX ORDER — TRANEXAMIC ACID 650 MG/1
1950 TABLET ORAL ONCE
Status: COMPLETED | OUTPATIENT
Start: 2023-07-26 | End: 2023-07-26

## 2023-07-26 RX ORDER — AMOXICILLIN 250 MG
1 CAPSULE ORAL EVERY MORNING
Status: DISCONTINUED | OUTPATIENT
Start: 2023-07-27 | End: 2023-07-28 | Stop reason: HOSPADM

## 2023-07-26 RX ORDER — LIDOCAINE 40 MG/G
CREAM TOPICAL
Status: DISCONTINUED | OUTPATIENT
Start: 2023-07-26 | End: 2023-07-28 | Stop reason: HOSPADM

## 2023-07-26 RX ORDER — OXYCODONE HYDROCHLORIDE 5 MG/1
10 TABLET ORAL EVERY 4 HOURS PRN
Status: DISCONTINUED | OUTPATIENT
Start: 2023-07-26 | End: 2023-07-28 | Stop reason: HOSPADM

## 2023-07-26 RX ORDER — AMOXICILLIN 250 MG
1-2 CAPSULE ORAL 2 TIMES DAILY
Qty: 30 TABLET | Refills: 0 | COMMUNITY
Start: 2023-07-26 | End: 2023-08-01

## 2023-07-26 RX ORDER — DEXAMETHASONE SODIUM PHOSPHATE 10 MG/ML
INJECTION, SOLUTION INTRAMUSCULAR; INTRAVENOUS PRN
Status: DISCONTINUED | OUTPATIENT
Start: 2023-07-26 | End: 2023-07-26

## 2023-07-26 RX ORDER — ONDANSETRON 2 MG/ML
4 INJECTION INTRAMUSCULAR; INTRAVENOUS EVERY 30 MIN PRN
Status: DISCONTINUED | OUTPATIENT
Start: 2023-07-26 | End: 2023-07-26 | Stop reason: HOSPADM

## 2023-07-26 RX ORDER — ACETAMINOPHEN 325 MG/1
650 TABLET ORAL EVERY 4 HOURS PRN
Status: DISCONTINUED | OUTPATIENT
Start: 2023-07-29 | End: 2023-07-28 | Stop reason: HOSPADM

## 2023-07-26 RX ORDER — LANOLIN ALCOHOL/MO/W.PET/CERES
6 CREAM (GRAM) TOPICAL
Status: DISCONTINUED | OUTPATIENT
Start: 2023-07-26 | End: 2023-07-28 | Stop reason: HOSPADM

## 2023-07-26 RX ORDER — FENTANYL CITRATE 50 UG/ML
25 INJECTION, SOLUTION INTRAMUSCULAR; INTRAVENOUS EVERY 5 MIN PRN
Status: DISCONTINUED | OUTPATIENT
Start: 2023-07-26 | End: 2023-07-26 | Stop reason: HOSPADM

## 2023-07-26 RX ORDER — FENTANYL CITRATE 50 UG/ML
25 INJECTION, SOLUTION INTRAMUSCULAR; INTRAVENOUS
Status: DISCONTINUED | OUTPATIENT
Start: 2023-07-26 | End: 2023-07-26

## 2023-07-26 RX ORDER — EPHEDRINE SULFATE 50 MG/ML
INJECTION, SOLUTION INTRAMUSCULAR; INTRAVENOUS; SUBCUTANEOUS PRN
Status: DISCONTINUED | OUTPATIENT
Start: 2023-07-26 | End: 2023-07-26

## 2023-07-26 RX ORDER — ONDANSETRON 4 MG/1
4 TABLET, ORALLY DISINTEGRATING ORAL EVERY 30 MIN PRN
Status: DISCONTINUED | OUTPATIENT
Start: 2023-07-26 | End: 2023-07-26 | Stop reason: HOSPADM

## 2023-07-26 RX ORDER — ACETAMINOPHEN 325 MG/1
975 TABLET ORAL EVERY 8 HOURS
Status: DISCONTINUED | OUTPATIENT
Start: 2023-07-26 | End: 2023-07-28 | Stop reason: HOSPADM

## 2023-07-26 RX ORDER — HYDROMORPHONE HCL IN WATER/PF 6 MG/30 ML
0.2 PATIENT CONTROLLED ANALGESIA SYRINGE INTRAVENOUS
Status: DISCONTINUED | OUTPATIENT
Start: 2023-07-26 | End: 2023-07-28 | Stop reason: HOSPADM

## 2023-07-26 RX ORDER — ONDANSETRON 2 MG/ML
INJECTION INTRAMUSCULAR; INTRAVENOUS PRN
Status: DISCONTINUED | OUTPATIENT
Start: 2023-07-26 | End: 2023-07-26

## 2023-07-26 RX ORDER — FLUTICASONE PROPIONATE 50 MCG
2 SPRAY, SUSPENSION (ML) NASAL EVERY MORNING
Status: DISCONTINUED | OUTPATIENT
Start: 2023-07-27 | End: 2023-07-28 | Stop reason: HOSPADM

## 2023-07-26 RX ORDER — ZINC GLUCONATE 50 MG
50 TABLET ORAL
Status: DISCONTINUED | OUTPATIENT
Start: 2023-07-27 | End: 2023-07-28 | Stop reason: HOSPADM

## 2023-07-26 RX ORDER — NALOXONE HYDROCHLORIDE 0.4 MG/ML
0.4 INJECTION, SOLUTION INTRAMUSCULAR; INTRAVENOUS; SUBCUTANEOUS
Status: DISCONTINUED | OUTPATIENT
Start: 2023-07-26 | End: 2023-07-28 | Stop reason: HOSPADM

## 2023-07-26 RX ORDER — PROCHLORPERAZINE MALEATE 5 MG
5 TABLET ORAL EVERY 6 HOURS PRN
Status: DISCONTINUED | OUTPATIENT
Start: 2023-07-26 | End: 2023-07-28 | Stop reason: HOSPADM

## 2023-07-26 RX ORDER — ASPIRIN 81 MG/1
81 TABLET ORAL 2 TIMES DAILY
Status: DISCONTINUED | OUTPATIENT
Start: 2023-07-26 | End: 2023-07-28 | Stop reason: HOSPADM

## 2023-07-26 RX ORDER — OXYCODONE HYDROCHLORIDE 5 MG/1
5 TABLET ORAL EVERY 4 HOURS PRN
Status: DISCONTINUED | OUTPATIENT
Start: 2023-07-26 | End: 2023-07-26

## 2023-07-26 RX ORDER — ONDANSETRON 2 MG/ML
4 INJECTION INTRAMUSCULAR; INTRAVENOUS EVERY 30 MIN PRN
Status: DISCONTINUED | OUTPATIENT
Start: 2023-07-26 | End: 2023-07-26

## 2023-07-26 RX ORDER — OXYCODONE HYDROCHLORIDE 5 MG/1
10 TABLET ORAL EVERY 4 HOURS PRN
Status: DISCONTINUED | OUTPATIENT
Start: 2023-07-26 | End: 2023-07-26

## 2023-07-26 RX ORDER — BISACODYL 10 MG
10 SUPPOSITORY, RECTAL RECTAL DAILY PRN
Status: DISCONTINUED | OUTPATIENT
Start: 2023-07-26 | End: 2023-07-28 | Stop reason: HOSPADM

## 2023-07-26 RX ORDER — ASPIRIN 81 MG/1
81 TABLET ORAL 2 TIMES DAILY
Qty: 60 TABLET | Refills: 0 | COMMUNITY
Start: 2023-07-26

## 2023-07-26 RX ORDER — BUPIVACAINE HYDROCHLORIDE 5 MG/ML
INJECTION, SOLUTION EPIDURAL; INTRACAUDAL
Status: COMPLETED | OUTPATIENT
Start: 2023-07-26 | End: 2023-07-26

## 2023-07-26 RX ORDER — VITAMIN B COMPLEX
25 TABLET ORAL EVERY MORNING
Status: DISCONTINUED | OUTPATIENT
Start: 2023-07-27 | End: 2023-07-28 | Stop reason: HOSPADM

## 2023-07-26 RX ORDER — NALOXONE HYDROCHLORIDE 0.4 MG/ML
0.2 INJECTION, SOLUTION INTRAMUSCULAR; INTRAVENOUS; SUBCUTANEOUS
Status: DISCONTINUED | OUTPATIENT
Start: 2023-07-26 | End: 2023-07-28 | Stop reason: HOSPADM

## 2023-07-26 RX ORDER — CEFAZOLIN SODIUM/WATER 2 G/20 ML
2 SYRINGE (ML) INTRAVENOUS SEE ADMIN INSTRUCTIONS
Status: DISCONTINUED | OUTPATIENT
Start: 2023-07-26 | End: 2023-07-26 | Stop reason: HOSPADM

## 2023-07-26 RX ORDER — LIDOCAINE 40 MG/G
CREAM TOPICAL
Status: DISCONTINUED | OUTPATIENT
Start: 2023-07-26 | End: 2023-07-26 | Stop reason: HOSPADM

## 2023-07-26 RX ORDER — CEFAZOLIN SODIUM/WATER 2 G/20 ML
2 SYRINGE (ML) INTRAVENOUS
Status: COMPLETED | OUTPATIENT
Start: 2023-07-26 | End: 2023-07-26

## 2023-07-26 RX ORDER — DULOXETIN HYDROCHLORIDE 60 MG/1
120 CAPSULE, DELAYED RELEASE ORAL EVERY MORNING
Status: DISCONTINUED | OUTPATIENT
Start: 2023-07-27 | End: 2023-07-28 | Stop reason: HOSPADM

## 2023-07-26 RX ORDER — AMOXICILLIN 250 MG
1 CAPSULE ORAL 2 TIMES DAILY
Status: DISCONTINUED | OUTPATIENT
Start: 2023-07-26 | End: 2023-07-28 | Stop reason: HOSPADM

## 2023-07-26 RX ORDER — ACETAMINOPHEN 325 MG/1
650 TABLET ORAL EVERY 4 HOURS PRN
Qty: 100 TABLET | Refills: 0 | COMMUNITY
Start: 2023-07-26 | End: 2023-08-01

## 2023-07-26 RX ORDER — BUPIVACAINE HYDROCHLORIDE 7.5 MG/ML
INJECTION, SOLUTION INTRASPINAL
Status: COMPLETED | OUTPATIENT
Start: 2023-07-26 | End: 2023-07-26

## 2023-07-26 RX ORDER — LIDOCAINE HYDROCHLORIDE 10 MG/ML
INJECTION, SOLUTION INFILTRATION; PERINEURAL PRN
Status: DISCONTINUED | OUTPATIENT
Start: 2023-07-26 | End: 2023-07-26

## 2023-07-26 RX ORDER — PROPOFOL 10 MG/ML
INJECTION, EMULSION INTRAVENOUS CONTINUOUS PRN
Status: DISCONTINUED | OUTPATIENT
Start: 2023-07-26 | End: 2023-07-26

## 2023-07-26 RX ORDER — ONDANSETRON 4 MG/1
4 TABLET, ORALLY DISINTEGRATING ORAL EVERY 30 MIN PRN
Status: DISCONTINUED | OUTPATIENT
Start: 2023-07-26 | End: 2023-07-26

## 2023-07-26 RX ORDER — OXYCODONE HYDROCHLORIDE 5 MG/1
5 TABLET ORAL EVERY 4 HOURS PRN
Status: DISCONTINUED | OUTPATIENT
Start: 2023-07-26 | End: 2023-07-28 | Stop reason: HOSPADM

## 2023-07-26 RX ORDER — ONDANSETRON 2 MG/ML
4 INJECTION INTRAMUSCULAR; INTRAVENOUS EVERY 6 HOURS PRN
Status: DISCONTINUED | OUTPATIENT
Start: 2023-07-26 | End: 2023-07-28 | Stop reason: HOSPADM

## 2023-07-26 RX ORDER — ZINC GLUCONATE 50 MG
50 TABLET ORAL
COMMUNITY

## 2023-07-26 RX ORDER — PROPOFOL 10 MG/ML
INJECTION, EMULSION INTRAVENOUS PRN
Status: DISCONTINUED | OUTPATIENT
Start: 2023-07-26 | End: 2023-07-26

## 2023-07-26 RX ORDER — HYDROMORPHONE HCL IN WATER/PF 6 MG/30 ML
0.4 PATIENT CONTROLLED ANALGESIA SYRINGE INTRAVENOUS
Status: DISCONTINUED | OUTPATIENT
Start: 2023-07-26 | End: 2023-07-28 | Stop reason: HOSPADM

## 2023-07-26 RX ORDER — ATENOLOL 25 MG/1
25 TABLET ORAL DAILY
Status: DISCONTINUED | OUTPATIENT
Start: 2023-07-27 | End: 2023-07-28 | Stop reason: HOSPADM

## 2023-07-26 RX ORDER — POLYETHYLENE GLYCOL 3350 17 G/17G
17 POWDER, FOR SOLUTION ORAL DAILY
Status: DISCONTINUED | OUTPATIENT
Start: 2023-07-27 | End: 2023-07-28 | Stop reason: HOSPADM

## 2023-07-26 RX ORDER — CALCIUM POLYCARBOPHIL 625 MG 625 MG/1
1250 TABLET ORAL AT BEDTIME
Status: DISCONTINUED | OUTPATIENT
Start: 2023-07-26 | End: 2023-07-28 | Stop reason: HOSPADM

## 2023-07-26 RX ORDER — ONDANSETRON 4 MG/1
4 TABLET, ORALLY DISINTEGRATING ORAL EVERY 6 HOURS PRN
Status: DISCONTINUED | OUTPATIENT
Start: 2023-07-26 | End: 2023-07-28 | Stop reason: HOSPADM

## 2023-07-26 RX ORDER — SODIUM CHLORIDE, SODIUM LACTATE, POTASSIUM CHLORIDE, CALCIUM CHLORIDE 600; 310; 30; 20 MG/100ML; MG/100ML; MG/100ML; MG/100ML
INJECTION, SOLUTION INTRAVENOUS CONTINUOUS
Status: DISCONTINUED | OUTPATIENT
Start: 2023-07-26 | End: 2023-07-28 | Stop reason: HOSPADM

## 2023-07-26 RX ORDER — HYDROMORPHONE HCL IN WATER/PF 6 MG/30 ML
0.4 PATIENT CONTROLLED ANALGESIA SYRINGE INTRAVENOUS EVERY 5 MIN PRN
Status: DISCONTINUED | OUTPATIENT
Start: 2023-07-26 | End: 2023-07-26 | Stop reason: HOSPADM

## 2023-07-26 RX ORDER — HYDROMORPHONE HCL IN WATER/PF 6 MG/30 ML
0.2 PATIENT CONTROLLED ANALGESIA SYRINGE INTRAVENOUS EVERY 5 MIN PRN
Status: DISCONTINUED | OUTPATIENT
Start: 2023-07-26 | End: 2023-07-26 | Stop reason: HOSPADM

## 2023-07-26 RX ORDER — OXYCODONE HYDROCHLORIDE 5 MG/1
5 TABLET ORAL EVERY 4 HOURS PRN
Qty: 20 TABLET | Refills: 0 | Status: SHIPPED | OUTPATIENT
Start: 2023-07-26 | End: 2023-08-01

## 2023-07-26 RX ORDER — FLUTICASONE PROPIONATE 50 MCG
2 SPRAY, SUSPENSION (ML) NASAL EVERY MORNING
COMMUNITY

## 2023-07-26 RX ORDER — CEFAZOLIN SODIUM 2 G/100ML
2 INJECTION, SOLUTION INTRAVENOUS EVERY 8 HOURS
Status: COMPLETED | OUTPATIENT
Start: 2023-07-26 | End: 2023-07-27

## 2023-07-26 RX ADMIN — MIDAZOLAM HYDROCHLORIDE 1 MG: 1 INJECTION, SOLUTION INTRAMUSCULAR; INTRAVENOUS at 11:15

## 2023-07-26 RX ADMIN — PHENYLEPHRINE HYDROCHLORIDE 0.5 MCG/KG/MIN: 10 INJECTION INTRAVENOUS at 11:55

## 2023-07-26 RX ADMIN — Medication 5 MG: at 12:13

## 2023-07-26 RX ADMIN — RISPERIDONE 4 MG: 1 TABLET ORAL at 19:03

## 2023-07-26 RX ADMIN — PROPOFOL 30 MG: 10 INJECTION, EMULSION INTRAVENOUS at 11:39

## 2023-07-26 RX ADMIN — BUPIVACAINE HYDROCHLORIDE 15 ML: 5 INJECTION, SOLUTION EPIDURAL; INTRACAUDAL; PERINEURAL at 11:18

## 2023-07-26 RX ADMIN — Medication 6 MG: at 20:46

## 2023-07-26 RX ADMIN — SODIUM CHLORIDE, POTASSIUM CHLORIDE, SODIUM LACTATE AND CALCIUM CHLORIDE: 600; 310; 30; 20 INJECTION, SOLUTION INTRAVENOUS at 11:00

## 2023-07-26 RX ADMIN — SODIUM CHLORIDE, POTASSIUM CHLORIDE, SODIUM LACTATE AND CALCIUM CHLORIDE: 600; 310; 30; 20 INJECTION, SOLUTION INTRAVENOUS at 12:12

## 2023-07-26 RX ADMIN — FENTANYL CITRATE 50 MCG: 50 INJECTION, SOLUTION INTRAMUSCULAR; INTRAVENOUS at 11:18

## 2023-07-26 RX ADMIN — Medication 5 MG: at 12:18

## 2023-07-26 RX ADMIN — Medication 5 MG: at 12:03

## 2023-07-26 RX ADMIN — PROPOFOL 100 MCG/KG/MIN: 10 INJECTION, EMULSION INTRAVENOUS at 11:42

## 2023-07-26 RX ADMIN — CEFAZOLIN SODIUM 2 G: 2 INJECTION, SOLUTION INTRAVENOUS at 19:05

## 2023-07-26 RX ADMIN — CALCIUM POLYCARBOPHIL 1250 MG: 625 TABLET, FILM COATED ORAL at 21:18

## 2023-07-26 RX ADMIN — ONDANSETRON 4 MG: 2 INJECTION INTRAMUSCULAR; INTRAVENOUS at 11:39

## 2023-07-26 RX ADMIN — Medication 2 G: at 11:34

## 2023-07-26 RX ADMIN — PHENYLEPHRINE HYDROCHLORIDE 100 MCG: 10 INJECTION INTRAVENOUS at 11:55

## 2023-07-26 RX ADMIN — SODIUM CHLORIDE, POTASSIUM CHLORIDE, SODIUM LACTATE AND CALCIUM CHLORIDE: 600; 310; 30; 20 INJECTION, SOLUTION INTRAVENOUS at 16:56

## 2023-07-26 RX ADMIN — TRANEXAMIC ACID 1950 MG: 650 TABLET ORAL at 11:00

## 2023-07-26 RX ADMIN — BUPIVACAINE HYDROCHLORIDE IN DEXTROSE 1.6 ML: 7.5 INJECTION, SOLUTION SUBARACHNOID at 11:41

## 2023-07-26 RX ADMIN — SENNOSIDES AND DOCUSATE SODIUM 1 TABLET: 50; 8.6 TABLET ORAL at 20:45

## 2023-07-26 RX ADMIN — ASPIRIN 81 MG: 81 TABLET, COATED ORAL at 20:45

## 2023-07-26 RX ADMIN — ACETAMINOPHEN 975 MG: 325 TABLET, FILM COATED ORAL at 21:18

## 2023-07-26 RX ADMIN — FENTANYL CITRATE 50 MCG: 50 INJECTION, SOLUTION INTRAMUSCULAR; INTRAVENOUS at 11:17

## 2023-07-26 RX ADMIN — DEXAMETHASONE SODIUM PHOSPHATE 5 MG: 10 INJECTION, SOLUTION INTRAMUSCULAR; INTRAVENOUS at 11:45

## 2023-07-26 RX ADMIN — Medication 10 MG: at 12:06

## 2023-07-26 RX ADMIN — OXYCODONE HYDROCHLORIDE 5 MG: 5 TABLET ORAL at 19:05

## 2023-07-26 RX ADMIN — LIDOCAINE HYDROCHLORIDE 3 ML: 10 INJECTION, SOLUTION INFILTRATION; PERINEURAL at 11:42

## 2023-07-26 ASSESSMENT — ACTIVITIES OF DAILY LIVING (ADL)
ADLS_ACUITY_SCORE: 32
ADLS_ACUITY_SCORE: 39
ADLS_ACUITY_SCORE: 39
ADLS_ACUITY_SCORE: 35
ADLS_ACUITY_SCORE: 39
ADLS_ACUITY_SCORE: 39
ADLS_ACUITY_SCORE: 42

## 2023-07-26 ASSESSMENT — ENCOUNTER SYMPTOMS: SEIZURES: 1

## 2023-07-26 NOTE — ANESTHESIA CARE TRANSFER NOTE
Patient: Deetphi Gomez    Procedure: Procedure(s):  LEFT TOTAL KNEE ARTHROPLASTY       Diagnosis: Primary osteoarthritis of left knee [M17.12]  Diagnosis Additional Information: No value filed.    Anesthesia Type:   Spinal     Note:    Oropharynx: oropharynx clear of all foreign objects  Level of Consciousness: awake  Oxygen Supplementation: face mask  Level of Supplemental Oxygen (L/min / FiO2): 5  Independent Airway: airway patency satisfactory and stable  Dentition: dentition unchanged  Vital Signs Stable: post-procedure vital signs reviewed and stable  Report to RN Given: handoff report given  Patient transferred to: PACU    Handoff Report: Identifed the Patient, Identified the Reponsible Provider, Reviewed the pertinent medical history, Discussed the surgical course, Reviewed Intra-OP anesthesia mangement and issues during anesthesia, Set expectations for post-procedure period and Allowed opportunity for questions and acknowledgement of understanding      Vitals:  Vitals Value Taken Time   /58 07/26/23 1332   Temp 36.3  C (97.3  F) 07/26/23 1331   Pulse 66 07/26/23 1332   Resp 20 07/26/23 1332   SpO2 96 % 07/26/23 1332   Vitals shown include unvalidated device data.    Electronically Signed By: ALESHA Stanley CRNA  July 26, 2023  1:34 PM

## 2023-07-26 NOTE — ANESTHESIA PREPROCEDURE EVALUATION
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)     Depressive disorder     Developmental delay     Diverticula of colon     DJD (degenerative joint disease)     LUC (generalized anxiety disorder)     Gastroesophageal reflux disease     Hydrocephalus (H)     Hypertension     Irritable bowel syndrome     Lateral epicondylitis     Left renal mass     Marfan's syndrome     Menopausal syndrome     MR (mental retardation)     Neural hearing loss     Neurogenic bladder     Obesity     Osteoarthritis of right shoulder     Psychosis (H)     S/p nephrectomy     SAH (subarachnoid hemorrhage) (H)     Seizures (H)     Sleep apnea     uses CPAP      Past Surgical History:   Procedure Laterality Date    CEREBRAL ANEURYSM REPAIR      CRANIOTOMY      HYSTERECTOMY      JOINT REPLACEMENT Right     TSA    NEPHRECTOMY      [facility] RECONSTR TOTAL SHOULDER IMPLANT Right 2017    Procedure: RIGHT TOTAL SHOULDER ARTHROPLASTY;  Surgeon: Salvador Miller MD;  Location: [facility];  Service: Orthopedics    [facility] TOTAL KNEE ARTHROPLASTY Right 2020    Procedure: RIGHT TOTAL KNEE ARTHROPLASTY;  Surgeon: Salvador Miller MD;  Location: [facility];  Service: Orthopedics      Allergies   Allergen Reactions    Penicillins     Sulfa Antibiotics       Social History     Tobacco Use    Smoking status: Former    Smokeless tobacco: Never    Tobacco comments:     quit years ago   Substance Use Topics    Alcohol use: No      Wt Readings from Last 1 Encounters:   23 101.6 kg (224 lb)        Anesthesia Evaluation   Pt has had prior anesthetic. Type: Regional.    No history of anesthetic complications        ROS/MED HX  ENT/Pulmonary:     (+) sleep apnea, uses CPAP,                                     Neurologic: Comment: History of Brain Aneurysm: S/P Left Frontotemporal Craniotomy with Aneurysm Clip in 1982: Stable per most recent CT Head on 3/21/23:  No acute intracranial process.   Postop changes and left frontotemporal encephalomalacia.     RLS    (+)       seizures, last seizure: 1980s,                 Developmental delay,       Cardiovascular:     (+)  - -   -  - -                                 Previous cardiac testing   Echo: Date: 7/21/22 Results:  Summary    1. Normal sinus rhythm during study.     2. The left ventricular size is normal.  Systolic function is normal.    The   estimated ejection fraction is 55-60%.  Wall thickness is borderline   increased.  Left ventricular segmental wall motion is normal.     3. Increased mid to apical LV trabeculation.  Patient declined contrast   use.   Cannot exclude component of noncompaction.     4. The right ventricular size is normal.  Systolic function is normal.     5. The left atrium is moderately enlarged.     6. The right atrium is borderline enlarged.     7. No significant valvular abnormalities.     8. The aortic root at the sinus of Valsalva is normal measuring 2.91 cm   with   an index of 1.37 cm/m2.     9. The proximal ascending aorta is normal measuring 3.14 cm with an index    of   1.48 cm/m2.     10. Mid ascending thoracic aorta diameter is normal measuring 3.5 cm.     11. Mid aortic arch is mildly dilated measuring 3.73 cm.  Distal aortic   arch   diameter is normal measuring 2.94 cm.     12. Compared to the prior study, there have been no significant changes     Stress Test:  Date: Results:    ECG Reviewed:  Date: Results:    Cath:  Date: Results:      METS/Exercise Tolerance:     Hematologic:       Musculoskeletal:   (+)  arthritis,             GI/Hepatic:     (+) GERD, Asymptomatic on medication,                  Renal/Genitourinary: Comment:  Neurogenic Bladder and Chronic Incontinence    (+) renal disease (Renal mass s/p nephrectomy),             Endo: Comment: Marfan Syndrome     (+)               Obesity,       Psychiatric/Substance Use: Comment: Hx of psychosis     (+) psychiatric history anxiety and depression       Infectious Disease:  - neg infectious disease ROS     Malignancy:  - neg malignancy ROS     Other:  - neg other ROS          Physical Exam    Airway  airway exam normal      Mallampati: III   TM distance: > 3 FB   Neck ROM: full   Mouth opening: > 3 cm    Respiratory Devices and Support         Dental  no notable dental history     (+) Modest Abnormalities - crowns, retainers, 1 or 2 missing teeth      Cardiovascular   cardiovascular exam normal       Rhythm and rate: regular and normal     Pulmonary   pulmonary exam normal        breath sounds clear to auscultation           OUTSIDE LABS:  CBC:   Lab Results   Component Value Date    HGB 9.7 (L) 09/24/2020    HGB 11.2 (L) 09/23/2020     BMP:   Lab Results   Component Value Date     09/23/2020    POTASSIUM 4.9 09/23/2020    CHLORIDE 105 09/23/2020    CO2 30 09/23/2020    BUN 13 09/23/2020    CR 0.89 09/23/2020     (H) 07/26/2023     (H) 09/23/2020     COAGS:   Lab Results   Component Value Date    INR 1.20 (H) 09/24/2020     POC: No results found for: BGM, HCG, HCGS  HEPATIC: No results found for: ALBUMIN, PROTTOTAL, ALT, AST, GGT, ALKPHOS, BILITOTAL, BILIDIRECT, MENDEL  OTHER:   Lab Results   Component Value Date    RADHA 9.7 09/23/2020       Anesthesia Plan    ASA Status:  3    NPO Status:  NPO Appropriate    Anesthesia Type: Spinal.              Consents    Anesthesia Plan(s) and associated risks, benefits, and realistic alternatives discussed. Questions answered and patient/representative(s) expressed understanding.     - Discussed:     - Discussed with:  Patient            Postoperative Care    Pain management: IV analgesics, Oral pain medications, Multi-modal  analgesia, Peripheral nerve block (Single Shot).   PONV prophylaxis: Ondansetron (or other 5HT-3), Dexamethasone or Solumedrol, Droperidol or Haldol     Comments:    Other Comments: Chart reviewed, including labs.  I reviewed the options and risks of a spinal with the patient and her care giver, including but not limited to: bleeding, infection, damage to tissues under the skin (nerves, muscles, bloodvessels) and headache.   Questions answered and agrees to proceed.    Consent obtained by patient as well as her care giver.            HENRIETTA BOBO MD

## 2023-07-26 NOTE — ANESTHESIA PROCEDURE NOTES
Adductor canal Procedure Note    Pre-Procedure   Staff -        Anesthesiologist:  Osiel Block MD       Performed By: anesthesiologist       Location: pre-op       Procedure Start/Stop Times: 7/26/2023 11:18 AM and 7/26/2023 11:19 AM       Pre-Anesthestic Checklist: patient identified, IV checked, site marked, risks and benefits discussed, informed consent, monitors and equipment checked, pre-op evaluation, at physician/surgeon's request and post-op pain management  Timeout:       Correct Patient: Yes        Correct Procedure: Yes        Correct Site: Yes        Correct Position: Yes        Correct Laterality: Yes        Site Marked: Yes  Procedure Documentation  Procedure: Adductor canal       Laterality: left       Patient Position: supine       Patient Prep/Sterile Barriers: sterile gloves, mask       Skin prep: Chloraprep       Needle Type: insulated       Needle Gauge: 20.        Needle Length (Inches): 4        Ultrasound guided       1. Ultrasound was used to identify targeted nerve, plexus, vascular marker, or fascial plane and place a needle adjacent to it in real-time.       2. Ultrasound was used to visualize the spread of anesthetic in close proximity to the above referenced structure.       3. A permanent image is entered into the patient's record.       4. The visualized anatomic structures appeared normal.       5. There were no apparent abnormal pathologic findings.    Assessment/Narrative         The placement was negative for: blood aspirated, painful injection and site bleeding       Paresthesias: No.       Bolus given via needle..        Secured via.        Insertion/Infusion Method: Single Shot       Complications: none       Injection made incrementally with aspirations every 5 mL.    Medication(s) Administered   Bupivacaine 0.5% PF (Infiltration) - Infiltration   15 mL - 7/26/2023 11:18:00 AM  Medication Administration Time: 7/26/2023 11:18 AM     Comments:  Negative aspiration every 5  "ml of medication administered. No signs of LAST. No pain or paresthesias with block placement. No complications.         FOR [facility] ([facility]) ONLY:   Pain Team Contact information: please page the Pain Team Via [employer]. Search \"Pain\". During daytime hours, please page the attending first. At night please page the resident first.      "

## 2023-07-26 NOTE — ANESTHESIA PROCEDURE NOTES
"Intrathecal injection Procedure Note    Pre-Procedure   Staff -        Anesthesiologist:  Jonathan Jacobson MD       Performed By: anesthesiologist       Location: OR       Procedure Start/Stop Times: 7/26/2023 11:37 AM and 7/26/2023 11:41 AM       Pre-Anesthestic Checklist: patient identified, IV checked, risks and benefits discussed, informed consent, monitors and equipment checked and pre-op evaluation  Timeout:       Correct Patient: Yes        Correct Procedure: Yes        Correct Site: Yes        Correct Position: Yes   Procedure Documentation  Procedure: intrathecal injection       Patient Position: sitting       Patient Prep/Sterile Barriers: sterile gloves, mask, patient draped       Skin prep: Chloraprep       Insertion Site: L3-4. (midline approach).       Needle Gauge: 24.        Needle Length (Inches): 4        Spinal Needle Type: Morris tip       Introducer used    Assessment/Narrative         Paresthesias: No.       CSF fluid: clear.    Medication(s) Administered   0.75% Hyperbaric Bupivacaine (Intrathecal) - Intrathecal   1.6 mL - 7/26/2023 11:41:00 AM  Medication Administration Time: 7/26/2023 11:37 AM      FOR [facility] ([facility]) ONLY:   Pain Team Contact information: please page the Pain Team Via "[employer]. Search \"Pain\". During daytime hours, please page the attending first. At night please page the resident first.      "

## 2023-07-26 NOTE — ANESTHESIA POSTPROCEDURE EVALUATION
Patient: Deepthi Gomez    Procedure: Procedure(s):  LEFT TOTAL KNEE ARTHROPLASTY       Anesthesia Type:  Spinal    Note:  Disposition: Admission   Postop Pain Control: Uneventful            Sign Out: Well controlled pain   PONV: No   Neuro/Psych: Uneventful            Sign Out: Acceptable/Baseline neuro status   Airway/Respiratory: Uneventful            Sign Out: Acceptable/Baseline resp. status   CV/Hemodynamics: Uneventful            Sign Out: Acceptable CV status; No obvious hypovolemia; No obvious fluid overload   Other NRE: NONE   DID A NON-ROUTINE EVENT OCCUR? No           Last vitals:  Vitals Value Taken Time   /61 07/26/23 1520   Temp 36.3  C (97.3  F) 07/26/23 1331   Pulse 65 07/26/23 1525   Resp 28 07/26/23 1406   SpO2 98 % 07/26/23 1525   Vitals shown include unvalidated device data.    Electronically Signed By: YRIS TORRES MD  July 26, 2023  3:26 PM

## 2023-07-26 NOTE — PHARMACY-ADMISSION MEDICATION HISTORY
Pharmacist Admission Medication History    Admission medication history is complete. The information provided in this note is only as accurate as the sources available at the time of the update.    Medication reconciliation/reorder completed by provider prior to medication history? No    Information Source(s): Caregiver and from group home, Had med list  via phone    Pertinent Information:     Patient did not bring any medications to the hospital and can't retrieve from home. No multi-dose medications are available for use during hospital stay.          Allergies reviewed with patient and updates made in EHR: no    Medication History Completed By: Vi Miranda RPH 7/26/2023 12:35 PM    Prior to Admission medications    Medication Sig Last Dose Taking? Auth Provider Long Term End Date   Acetaminophen (TYLENOL PO) Take 1,000 mg by mouth 3 times daily 7/25/2023 Yes Reported, Patient     atenolol (TENORMIN) 25 MG tablet Take 25 mg by mouth every morning 7/26/2023 at AM Yes Reported, Patient Yes    benzocaine (ORAJEL MAXIMUM STRENGTH) 20 % GEL gel Take by mouth 4 times daily as needed for mouth sores Unknown Yes Unknown, Entered By History     calcium polycarbophil (FIBERCON) 625 MG tablet Take 2 tablets by mouth At Bedtime 7/25/2023 Yes Reported, Patient     cholecalciferol (VITAMIN D3) 25 mcg (1000 units) capsule Take 1 capsule by mouth every morning 7/19/2023 Yes Unknown, Entered By History     DULoxetine (CYMBALTA) 60 MG capsule Take 120 mg by mouth every morning 7/26/2023 Yes Reported, Patient Yes    fluticasone (FLONASE) 50 MCG/ACT nasal spray Spray 2 sprays into both nostrils every morning 7/26/2023 at not with Yes Unknown, Entered By History     lidocaine-prilocaine (EMLA) 2.5-2.5 % external cream Apply topically as needed for moderate pain Unknown Yes Reported, Patient Yes    loperamide (IMODIUM) 2 MG capsule Take 2 mg by mouth 2 times daily as needed for diarrhea Unknown Yes Reported, Patient     melatonin 3 MG  tablet Take 6 mg by mouth At Bedtime 7/25/2023 Yes Reported, Patient     metroNIDAZOLE (METROCREAM) 0.75 % cream Apply topically At Bedtime 7/25/2023 at not with Yes Reported, Patient     mirabegron (MYRBETRIQ) 50 MG 24 hr tablet Take 50 mg by mouth every morning 7/25/2023 Yes Reported, Patient     multivitamin, therapeutic with minerals (THERA-VIT-M) TABS tablet Take 1 tablet by mouth every morning 7/19/2023 Yes Reported, Patient     Omega-3 Fatty Acids (FISH OIL OMEGA-3) 1000 MG CAPS Take 2 capsules by mouth At Bedtime 7/18/2023 Yes Reported, Patient     omeprazole 20 MG tablet Take 20 mg by mouth every morning (before breakfast) 7/25/2023 Yes Reported, Patient     risperiDONE (RISPERDAL) 4 MG tablet Take 4 mg by mouth daily At 6 PM 7/25/2023 Yes Reported, Patient Yes    senna-docusate (SENOKOT-S;PERICOLACE) 8.6-50 MG per tablet Take 1 tablet by mouth every morning 7/25/2023 Yes Reported, Patient     zinc gluconate 50 MG tablet Take 50 mg by mouth daily (with breakfast) 7/19/2023 Yes Unknown, Entered By History

## 2023-07-26 NOTE — INTERVAL H&P NOTE
"I have reviewed the surgical (or preoperative) H&P that is linked to this encounter, and examined the patient. There are no significant changes    Clinical Conditions Present on Arrival:  Clinically Significant Risk Factors Present on Admission                 # Drug Induced Platelet Defect: home medication list includes an antiplatelet medication  # Obesity: Estimated body mass index is 36.15 kg/m  as calculated from the following:    Height as of this encounter: 1.676 m (5' 6\").    Weight as of this encounter: 101.6 kg (224 lb).       "

## 2023-07-26 NOTE — OP NOTE
DATE OF SERVICE: 7/26/2023     PREOPERATIVE DIAGNOSIS: Primary osteoarthritis of left knee [M17.12]     POSTOPERATIVE DIAGNOSIS: Same    PROCEDURE: Procedure(s):  LEFT TOTAL KNEE ARTHROPLASTY     IMPLANTS:   Implant Name Type Inv. Item Serial No.  Lot No. LRB No. Used Action   IMP COMP FEM STRK TRIATHLN CR LT 3 5510-F-301 - [id number] Total Joint Component/Insert IMP COMP FEM STRK TRIATHLN CR LT 3 5510-F-301  [employer] [id number] Left 1 Implanted   IMP INSERT TIBIAL STRK TRI 3X09MM [id number] - [id number] Total Joint Component/Insert IMP INSERT TIBIAL STRK TRI 3X09MM [id number]  [employer] [id number] Left 1 Implanted   IMP INSERT BASEPLATE TIBIAL HOWM TRI 3 5521-B-300 - [id number] Total Joint Component/Insert IMP INSERT BASEPLATE TIBIAL HOWM TRI 3 5521-B-300  [employer] [id number] Left 1 Implanted   IMP COMP PATELLA HOWM TRI ASYM [id number] 555-L-299 - [id number] Total Joint Component/Insert IMP COMP PATELLA HOWM TRI ASYM [id number] 555-L-299  [employer] [id number] Left 1 Implanted   BONE CEMENT SIMPLEX FULL DOSE 6191-1-001 - [id number] Cement, Bone BONE CEMENT SIMPLEX FULL DOSE 6191-1-001  [employer] [id number] Left 2 Implanted        FINDINGS: Severe tricompartmental degenerative arthritis with valgus malalignment and significant patellar wear.    SURGEON: REESE MARSH MD     ASSISTANT: Snehal Johnson PA-C  The assistant was necessary for patient safety including positioning, retraction, instrumentation, placement of implants, wound closure and dressing application.    INDICATIONS: The patient is a 68-year-old relatively healthy individual who has had persistent and progressively worsening left knee pain present for a number of years.  Despite a long course of nonoperative therapy she failed to improve and after having reviewed the risks and benefits of different treatment options she elected to proceed with surgical intervention specifically a left total knee arthroplasty.   The normal postop course was discussed in detail preoperatively.    PROCEDURE: After informed written consent was obtained, the patient underwent successful placement of a right knee block and was brought to the OR where they underwent successful placement of a Spinal with Block.  The patient received IV antibiotics.  The leg was sterilely prepped and draped in usual fashion.  After a pause for the cause or timeout with identification of the operative limb, a tourniquet was inflated to 225 mmHg after exsanguination and an [place] drape was applied.  The surgical team were operative hoods.    Direct midline approach to the knee was made with sharp dissection being carried down through the skin and subcutaneous tissue and a medial parapatellar approach was performed.  An effusion was noted.  There was synovitis.  There was significant degenerative change noted with full loss of articular cartilage as well as osteophytic changes apparent.    A rongeur was used to remove osteophytes.  A guide kasey was gently advanced up the femur and the distal femoral cut made without difficulty.  The femur was sized to the appropriate component with the anterior posterior and chamfer as well as sulcus cuts being made without difficulty.  A line to line fit with the trial component was noted.    Attention was then directed towards the tibia.  The tibia was cut referenced off the deficient tibial plateau.  The tibia was sized to the appropriate component.  The appropriate spacer was then applied with full extension being noted as well as excellent flexion.  Good varus valgus stability was appreciated at both 0 and 30 .    The patella was then measured with a caliper cut and sized to the appropriate component.  Midline patellar tracking was appreciated.    The rotation was determined off the trial components.  Meniscal and ACL remnants were debrided.  The  pericapsular tissues were injected with an analgesic anesthetic mix.  Care was taken  to avoid the neurovascular  throughout the injection procedure as well as the cutting of both the femur and the tibia.    Conical drill and wing punch were used for tibial prep.  Copious jet lavage with an additional liter of fluid was performed.  At this point cementing of the tibia was followed by placement of the spacer then the femur then the patella.  All components were held under compression during cement hardening and all extra cement was meticulously removed.    Range of motion tracking and stability were unchanged post fixation.  At this point the tourniquet was let down and hemostasis was achieved using electrocautery.  1 L of dilute sterile Betadine solution was used to irrigate the joint for 3 minutes time.  Further irrigation was then followed by closure with the knee flexed at 90  with multiple #1 Vicryl reinforced with 0 Vicryl subcutaneous tissue with 2-0 Vicryl and skin with Monocryl and Dermabond.  A sterile dressing was applied.  The patient tolerated procedure the procedure well.  There were no known complications.  Sponge and needle counts were reported as correct ×2.  The patient the patient was transferred to [facility] for recovery.  Estimated blood loss was 25 cc.    Salvador Miller MD

## 2023-07-26 NOTE — PROGRESS NOTES
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 chronic psychiatric meds with history of depression anxiety and borderline personality disorder see problem list    Plan per Ortho chronic medications will be continued including Risperdal and Celexa  As discussed with her sister is likely she will need to recovering since a TCU or rehab because the group home would be overwhelmed she has not had problems with behaviors according to her sister for many years        Assessment & Plan     Principal Problem:    S/P total knee arthroplasty (7/26/2023)  Active Problems:    Moderate intellectual disabilities (6/4/2018)    Left knee DJD (7/26/2023)    Lives in group home (7/26/2023)    LUC (generalized anxiety disorder) (9/1/2011)    Neurogenic bladder (12/9/2002)    Sleep apnea (8/19/2013)    S/p nephrectomy (3/21/2013)    Congenital hydrocephalus (H) (7/26/2023)    Developmental delay (7/26/2023)    S/P total knee arthroplasty, right (9/22/2020)        Past Surgical History:   Procedure Laterality Date    CEREBRAL ANEURYSM REPAIR      CRANIOTOMY      HYSTERECTOMY      JOINT REPLACEMENT Right     TSA    NEPHRECTOMY  2013    [facility] RECONSTR TOTAL SHOULDER IMPLANT Right 4/11/2017    Procedure: RIGHT TOTAL SHOULDER ARTHROPLASTY;  Surgeon: Salvador Miller MD;  Location: [facility];  Service: Orthopedics    [facility] TOTAL KNEE ARTHROPLASTY Right 9/22/2020    Procedure: RIGHT TOTAL KNEE ARTHROPLASTY;  Surgeon: Salvador Miller MD;  Location: [facility];  Service:  Orthopedics     Past Medical History:   Diagnosis Date    Adenomatous polyp of colon     Benign neoplasm of colon     Brain aneurysm     Bruxism     Cancer (H)     possible renal cancer    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)     Depressive disorder     Developmental delay     Diverticula of colon     DJD (degenerative joint disease)     LUC (generalized anxiety disorder)     Gastroesophageal reflux disease     Hydrocephalus (H)     Hypertension     Irritable bowel syndrome     Lateral epicondylitis     Left renal mass     Marfan's syndrome     Menopausal syndrome     MR (mental retardation)     Neural hearing loss     Neurogenic bladder     Obesity     Osteoarthritis of right shoulder     Psychosis (H)     S/p nephrectomy     SAH (subarachnoid hemorrhage) (H)     Seizures (H)     Sleep apnea     uses CPAP         The patient has a family history of no significant hereditary genetic or congenital diseases    Social History     Socioeconomic History    Marital status: Single     Spouse name: Not on file    Number of children: Not on file    Years of education: Not on file    Highest education level: Not on file   Occupational History    Not on file   Tobacco Use    Smoking status: Former    Smokeless tobacco: Never    Tobacco comments:     quit years ago   Vaping Use    Vaping Use: Never used   Substance and Sexual Activity    Alcohol use: No    Drug use: No    Sexual activity: Not on file   Other Topics Concern    Not on file   Social History Narrative    Cognitively disabled lives in group home congenital hydrocephalus and related intellectual debilities she did get her GED    Sister Oly is legal guardian primary contact and      Social Determinants of Health     Financial Resource Strain: Not on file   Food Insecurity: Not on file   Transportation Needs: Not on file   Physical Activity: Not on file   Stress: Not on file   Social Connections: Not on file   Intimate Partner Violence: Not on file  "  Housing Stability: Not on file                 Diet: Advance Diet as Tolerated: Regular Diet Adult  Discharge Instruction - Regular Diet Adult    DVT Prophylaxis: Aspirin per Ortho  Schmidt Catheter: Not present  Lines: None     Cardiac Monitoring: ACTIVE order. Indication: Procedural area  Code Status:  Full code    Clinically Significant Risk Factors Present on Admission                       # Obesity: Estimated body mass index is 36.15 kg/m  as calculated from the following:    Height as of this encounter: 1.676 m (5' 6\").    Weight as of this encounter: 101.6 kg (224 lb).            Disposition Plan   TCU or rehab     Expected Discharge Date: 07/27/2023                  Alistair Bishop MD  Hospitalist Service  [facility]  Securely message with [employer] (more info)  Text page via [employer] Paging/Directory   ______________________________________________________________________    Interval History see above    Physical Exam   Vital Signs: Temp: 97.4  F (36.3  C) Temp src: Temporal BP: 128/56 Pulse: 67   Resp: 16 SpO2: 98 % O2 Device: Nasal cannula Oxygen Delivery: 2 LPM  Weight: 224 lbs 0 oz    Pleasant elderly woman with tardive dyskinesia and obvious cognitive intellectual debility she is able to smile she has the lipsmacking movement and other odd movements of her mouth  Her Sister Oly was here and provided historical update    Data         Recent Labs   Lab 07/26/23  1019   *       Time 60 minutes including extensive review of chart editing and updating chart correcting problem list discussion with sister primary   Discussion with RN  "

## 2023-07-27 ENCOUNTER — APPOINTMENT (OUTPATIENT)
Dept: OCCUPATIONAL THERAPY | Facility: CLINIC | Age: 68
End: 2023-07-27
Attending: PHYSICIAN ASSISTANT
Payer: COMMERCIAL

## 2023-07-27 ENCOUNTER — APPOINTMENT (OUTPATIENT)
Dept: PHYSICAL THERAPY | Facility: CLINIC | Age: 68
End: 2023-07-27
Attending: PHYSICIAN ASSISTANT
Payer: COMMERCIAL

## 2023-07-27 LAB
FASTING STATUS PATIENT QL REPORTED: YES
GLUCOSE BLD-MCNC: 118 MG/DL (ref 70–125)
HGB BLD-MCNC: 9.5 G/DL (ref 11.7–15.7)

## 2023-07-27 PROCEDURE — 85018 HEMOGLOBIN: CPT | Performed by: PHYSICIAN ASSISTANT

## 2023-07-27 PROCEDURE — 97166 OT EVAL MOD COMPLEX 45 MIN: CPT | Mod: GO

## 2023-07-27 PROCEDURE — 97116 GAIT TRAINING THERAPY: CPT | Mod: GP

## 2023-07-27 PROCEDURE — 82947 ASSAY GLUCOSE BLOOD QUANT: CPT | Performed by: ORTHOPAEDIC SURGERY

## 2023-07-27 PROCEDURE — 250N000013 HC RX MED GY IP 250 OP 250 PS 637: Performed by: HOSPITALIST

## 2023-07-27 PROCEDURE — 97530 THERAPEUTIC ACTIVITIES: CPT | Mod: GP

## 2023-07-27 PROCEDURE — 250N000013 HC RX MED GY IP 250 OP 250 PS 637: Performed by: INTERNAL MEDICINE

## 2023-07-27 PROCEDURE — 99231 SBSQ HOSP IP/OBS SF/LOW 25: CPT | Performed by: INTERNAL MEDICINE

## 2023-07-27 PROCEDURE — 97162 PT EVAL MOD COMPLEX 30 MIN: CPT | Mod: GP

## 2023-07-27 PROCEDURE — 36415 COLL VENOUS BLD VENIPUNCTURE: CPT | Performed by: PHYSICIAN ASSISTANT

## 2023-07-27 PROCEDURE — 97535 SELF CARE MNGMENT TRAINING: CPT | Mod: GO

## 2023-07-27 PROCEDURE — 250N000011 HC RX IP 250 OP 636: Mod: JZ | Performed by: PHYSICIAN ASSISTANT

## 2023-07-27 PROCEDURE — 250N000013 HC RX MED GY IP 250 OP 250 PS 637: Performed by: PHYSICIAN ASSISTANT

## 2023-07-27 PROCEDURE — 258N000003 HC RX IP 258 OP 636: Performed by: PHYSICIAN ASSISTANT

## 2023-07-27 RX ADMIN — MIRABEGRON 50 MG: 25 TABLET, FILM COATED, EXTENDED RELEASE ORAL at 08:54

## 2023-07-27 RX ADMIN — ACETAMINOPHEN 975 MG: 325 TABLET, FILM COATED ORAL at 08:57

## 2023-07-27 RX ADMIN — CEFAZOLIN SODIUM 2 G: 2 INJECTION, SOLUTION INTRAVENOUS at 02:47

## 2023-07-27 RX ADMIN — ASPIRIN 81 MG: 81 TABLET, COATED ORAL at 20:54

## 2023-07-27 RX ADMIN — ATENOLOL 25 MG: 25 TABLET ORAL at 08:58

## 2023-07-27 RX ADMIN — Medication 2 SPRAY: at 08:54

## 2023-07-27 RX ADMIN — ACETAMINOPHEN 975 MG: 325 TABLET, FILM COATED ORAL at 17:17

## 2023-07-27 RX ADMIN — SENNOSIDES AND DOCUSATE SODIUM 1 TABLET: 50; 8.6 TABLET ORAL at 20:54

## 2023-07-27 RX ADMIN — DULOXETINE HYDROCHLORIDE 120 MG: 60 CAPSULE, DELAYED RELEASE PELLETS ORAL at 08:53

## 2023-07-27 RX ADMIN — SENNOSIDES AND DOCUSATE SODIUM 1 TABLET: 50; 8.6 TABLET ORAL at 08:54

## 2023-07-27 RX ADMIN — POLYETHYLENE GLYCOL 3350 17 G: 17 POWDER, FOR SOLUTION ORAL at 08:53

## 2023-07-27 RX ADMIN — PANTOPRAZOLE SODIUM 40 MG: 40 TABLET, DELAYED RELEASE ORAL at 08:53

## 2023-07-27 RX ADMIN — RISPERIDONE 4 MG: 1 TABLET ORAL at 17:17

## 2023-07-27 RX ADMIN — OXYCODONE HYDROCHLORIDE 5 MG: 5 TABLET ORAL at 21:06

## 2023-07-27 RX ADMIN — Medication 6 MG: at 21:03

## 2023-07-27 RX ADMIN — ASPIRIN 81 MG: 81 TABLET, COATED ORAL at 08:57

## 2023-07-27 RX ADMIN — Medication 25 MCG: at 08:54

## 2023-07-27 RX ADMIN — SODIUM CHLORIDE, POTASSIUM CHLORIDE, SODIUM LACTATE AND CALCIUM CHLORIDE: 600; 310; 30; 20 INJECTION, SOLUTION INTRAVENOUS at 02:47

## 2023-07-27 RX ADMIN — Medication 50 MG: at 08:54

## 2023-07-27 RX ADMIN — SENNOSIDES AND DOCUSATE SODIUM 1 TABLET: 50; 8.6 TABLET ORAL at 08:58

## 2023-07-27 RX ADMIN — OXYCODONE HYDROCHLORIDE 5 MG: 5 TABLET ORAL at 10:23

## 2023-07-27 RX ADMIN — CALCIUM POLYCARBOPHIL 1250 MG: 625 TABLET, FILM COATED ORAL at 21:06

## 2023-07-27 ASSESSMENT — ACTIVITIES OF DAILY LIVING (ADL)
ADLS_ACUITY_SCORE: 42
PREVIOUS_RESPONSIBILITIES: HOUSEKEEPING
ADLS_ACUITY_SCORE: 42
ADLS_ACUITY_SCORE: 42
ADLS_ACUITY_SCORE: 34
ADLS_ACUITY_SCORE: 42
ADLS_ACUITY_SCORE: 34
DEPENDENT_IADLS:: CLEANING;COOKING;LAUNDRY;SHOPPING;MEAL PREPARATION;MEDICATION MANAGEMENT;MONEY MANAGEMENT;TRANSPORTATION
ADLS_ACUITY_SCORE: 42
ADLS_ACUITY_SCORE: 34
ADLS_ACUITY_SCORE: 42
ADLS_ACUITY_SCORE: 34

## 2023-07-27 NOTE — PROGRESS NOTES
Patient vital signs are at baseline: Yes  Patient able to ambulate as they were prior to admission or with assist devices provided by therapies during their stay:  No,  Reason:  Pt surgical leg still numb. Able to move well in bed. Good blood return on surgical leg and warm, good pulse.  Patient MUST void prior to discharge:  Yes  Patient able to tolerate oral intake:  Yes  Pain has adequate pain control using Oral analgesics:  Yes  Does patient have an identified :  Yes - sister  Has goal D/C date and time been discussed with patient:  Yes

## 2023-07-27 NOTE — PROGRESS NOTES
07/27/23 1330   Appointment Info   Signing Clinician's Name / Credentials (OT) Maximo Bustillos, OTR/L   Quick Adds   Quick Adds Certification   Living Environment   People in Home facility resident   Current Living Arrangements group home   Living Environment Comments WIS with GB and shower chair, RTS and bilat GB   Self-Care   Usual Activity Tolerance moderate   Current Activity Tolerance fair   Equipment Currently Used at Home cane, straight   Instrumental Activities of Daily Living (IADL)   Previous Responsibilities housekeeping   IADL Comments Grp Home staff assist with many of Pt's IADLs   General Information   Onset of Illness/Injury or Date of Surgery 07/26/23   Referring Physician Dr. Salvador Miller   Patient/Family Therapy Goal Statement (OT) Pt and her sister are planning on the Pt going to TCU   Additional Occupational Profile Info/Pertinent History of Current Problem Adm for TKA.  PMH:  Congenital hydrocephalus, Dev. Delayed, LUC, R TKA in 2020, CASSIE   Left Lower Extremity (Weight-bearing Status) weight-bearing as tolerated (WBAT)   Cognitive Status Examination   Follows Commands WFL   Visual Perception   Visual Impairment/Limitations corrective lenses full-time  (Also wears bilat HA)   Bed Mobility   Bed Mobility supine-sit;sit-supine   Supine-Sit [place] (Bed Mobility) contact guard   Sit-Supine [place] (Bed Mobility) contact guard   Assistive Device (Bed Mobility) bed rails   Transfers   Transfers sit-stand transfer;bed-chair transfer;toilet transfer   Transfer Skill: Bed to Chair/Chair to Bed   Bed-Chair [place] (Transfers) contact guard   Assistive Device (Bed-Chair Transfers) rolling walker   Sit-Stand Transfer   Sit-Stand [place] (Transfers) contact guard   Assistive Device (Sit-Stand Transfers) walker, front-wheeled   Toilet Transfer   Type (Toilet Transfer) sit-stand;stand-sit   [place] Level (Toilet Transfer) contact guard   Assistive Device (Toilet Transfer) walker,  front-wheeled   Balance   Balance Assessment standing balance: dynamic   Balance Comments good   Activities of Daily Living   BADL Assessment/Intervention lower body dressing;grooming;toileting   Lower Body Dressing Assessment/Training   Position (Lower Body Dressing) supported sitting;supported standing   [place] Level (Lower Body Dressing) contact guard assist   Grooming Assessment/Training   Position (Grooming) supported standing   [place] Level (Grooming) contact guard assist   Toileting   Position (Toileting) supported sitting;supported standing   Assistive Devices (Toileting) raised toilet seat;grab bar, toilet   [place] Level (Toileting) contact guard assist   Clinical Impression   Criteria for Skilled Therapeutic Interventions Met (OT) Yes, treatment indicated   OT Diagnosis dereased indep with ADLs and trsfs due to TKA   OT Problem List-Impairments impacting ADL problems related to;mobility   Assessment of Occupational Performance 3-5 Performance Deficits   Identified Performance Deficits dressing, toileting, bathing, G/H and trsfs   Planned Therapy Interventions (OT) ADL retraining   Clinical Decision Making Complexity (OT) moderate complexity   Risk & Benefits of therapy have been explained evaluation/treatment results reviewed;participants included;patient;sibling   OT Total Evaluation Time   OT Eval, Moderate Complexity Minutes (93702) 15   Therapy Certification   Medical Diagnosis TKA   Start of Care Date 07/27/23   Certification date from 07/27/23   Certification date to 08/27/23   OT Goals   Therapy Frequency (OT) Daily   OT Predicted Duration/Target Date for Goal Attainment 07/27/23   OT Goals Lower Body Dressing;Transfers   OT: Lower Body Dressing Supervision/stand-by assist   OT: Transfer Supervision/stand-by assist   Interventions   Interventions Quick Adds Self-Care/Home Management   Self-Care/Home Management   Self-Care/Home Mgmt/ADL, Compensatory, Meal Prep Minutes (49572) 30    Treatment Detail/Skilled Intervention Pt was in bed when therapist arrived.  Reviewed with Pt her TKA precautions and bed positioning.  Worked on bed mobility with HOB elevated and use of bedside rails.  CGA of one with supine to sitting EOB.  Sit to stand with CGA of one using FWW.  Trsf to the chair with CGA of one.  Worked on L/B dressing with CGA of one.   VC for sequencing.  Pt also completed trsfs to the BR toilet with commode overlay.  CGA of one for toileting.  Trsf to BR sink with CGA and completed hand washing after toileting.  Trsf to bed with CGA of one using FWW.  Pt was SBA with lifting her legs up onto the bed.  At end of session, Pt was in bed with bed alarm on and call light within reach.   OT Discharge Planning   OT Plan Work on FB dressing.  Try WIS trsf with shower chair.  Review car trsf.   OT Discharge Recommendation (DC Rec)   (Defer to Ortho Team)   OT Rationale for DC Rec Pt will benefit from further OT prior to returning to her group home.   OT Brief overview of current status CGA of one with trsfs and ADLs.   Total Session Time   Timed Code Treatment Minutes 30   Total Session Time (sum of timed and untimed services) 45    [facility]  OUTPATIENT OCCUPATIONAL THERAPY  EVALUATION  PLAN OF TREATMENT FOR OUTPATIENT REHABILITATION  (COMPLETE FOR INITIAL CLAIMS ONLY)  Patient's Last Name, First Name, M.I.  YOB: 1955  Deepthi Gomez                          Provider's Name  [facility] Medical Record No.  [id number]                             Onset Date:  07/26/23   Start of Care Date:  07/27/23   Type:     ___PT   _X_OT   ___SLP Medical Diagnosis:  TKA                    OT Diagnosis:  dereased indep with ADLs and trsfs due to TKA Visits from SOC:  1     See note for plan of treatment, functional goals and certification details    I CERTIFY THE NEED FOR THESE SERVICES FURNISHED UNDER        THIS PLAN OF TREATMENT  AND WHILE UNDER MY CARE     (Physician co-signature of this document indicates review and certification of the therapy plan).

## 2023-07-27 NOTE — PROGRESS NOTES
"Orthopedic Progress Note      Assessment: 1 Day Post-Op  S/P Procedure(s):  LEFT TOTAL KNEE ARTHROPLASTY @    Plan:   - Continue PT/OT.   - Weightbearing status: WBAT.  - Anticoagulation: ASAin addition to SCDs, alejandro stockings and early ambulation.  - Discharge planning: Discharge to TCU, lives in group home unable to take care of during immediate post op period. TCU palcement      Subjective:  Pain: Well controlled on Tylenol & oxycodone.  Nausea, Vomiting:  No  Chest pain: No  Lightheadedness, Dizziness:  No  Neuro:  Patient denies new onset numbness or paresthesias in left lower extremity     Patient is doing well on POD #1. Ambulating, tolerating oral intake, voiding & pain is controlled with oral medication. Ready for discharge. Hgb 9.5      Objective:  /71 (BP Location: Right arm)   Pulse 74   Temp 97  F (36.1  C) (Oral)   Resp 16   Ht 1.676 m (5' 6\")   Wt 101.6 kg (224 lb)   SpO2 93%   BMI 36.15 kg/m    The patient is A&Ox3. Appears comfortable, sitting up at bedside.  Sensation is intact to light touch & equal bilaterally in the L2 through S1 dermatomes.  Calves are soft and non-tender.  Dorsiflexion and plantar flexion is intact bilaterally.  Appropriate flexion and extension of the toes bilaterally.   Brisk capillary refill in the toes bilaterally.   Palpable left dorsalis pedis pulse.  left knee dressing C/D/I.      Pertinent Labs   Lab Results: personally reviewed.   Lab Results   Component Value Date    INR 1.20 (H) 09/24/2020    INR 1.10 09/23/2020    INR 1.08 09/22/2020     Lab Results   Component Value Date    HGB 9.5 (L) 07/27/2023     Lab Results   Component Value Date     09/23/2020    CO2 30 09/23/2020         Report completed by:  Chica Frankel PA-C/Dr. Miller  [facility]    Date: 7/27/2023  Time: 10:05 AM    "

## 2023-07-27 NOTE — PROGRESS NOTES
"[facility]    Medicine Progress Note - Hospitalist Service    Date of Admission:  7/26/2023    Assessment & Plan   Principal Problem:    S/P total knee arthroplasty (7/26/2023)  Active Problems:    Moderate intellectual disabilities (6/4/2018)    Left knee DJD (7/26/2023)    Lives in group home (7/26/2023)    LUC (generalized anxiety disorder) (9/1/2011)    Neurogenic bladder (12/9/2002)    Sleep apnea (8/19/2013)    S/p nephrectomy (3/21/2013)    Congenital hydrocephalus (H) (7/26/2023)    Developmental delay (7/26/2023)    S/P total knee arthroplasty, right (9/22/2020)    She is doing quite well she is all smiles today obviously she has some cognitive deficit as noted on the problem list from childhood  She will need TCU as discussed with PT OT  Probably ready to be discharged tomorrow Friday         Diet: Discharge Instruction - Regular Diet Adult  Regular Diet Adult    DVT Prophylaxis: Aspirin twice a day per Ortho  Schmidt Catheter: Not present  Lines: None     Cardiac Monitoring: None  Code Status: Full Code      Clinically Significant Risk Factors Present on Admission                       # Obesity: Estimated body mass index is 36.15 kg/m  as calculated from the following:    Height as of this encounter: 1.676 m (5' 6\").    Weight as of this encounter: 101.6 kg (224 lb).            Disposition Plan      Expected Discharge Date: 07/28/2023, 12:00 PM      Discharge Comments: OBRA level II, [facility] accepted pending PA from insurance.          Alistair Bishop MD  Hospitalist Service  [facility]  Securely message with [employer] (more info)  Text page via [facility] Paging/Directory   ______________________________________________________________________    Interval History   See above    Physical Exam   Vital Signs: Temp: 97.1  F (36.2  C) Temp src: Oral BP: 113/55 Pulse: 67   Resp: 18 SpO2: 96 % O2 Device: None (Room air) Oxygen Delivery: 2 LPM  Weight: 224 " lbs 0 oz  Comfortable in bed easy to smile obviously has some mental retardation pains under control    Medical Decision Making             Data

## 2023-07-27 NOTE — PLAN OF CARE
Patient vital signs are at baseline: Yes  Patient able to ambulate as they were prior to admission or with assist devices provided by therapies during their stay:  No,  Reason:  Not up yet. Numb. Offered to get pt to side of bed, only , patient refused.   Patient MUST void prior to discharge:  Yes  Patient able to tolerate oral intake:  Yes  Pain has adequate pain control using Oral analgesics:  Yes  Does patient have an identified :  Yes  Has goal D/C date and time been discussed with patient:  Yes

## 2023-07-27 NOTE — PLAN OF CARE
Patient vital signs are at baseline: Yes  Patient able to ambulate as they were prior to admission or with assist devices provided by therapies during their stay:  Yes  Patient MUST void prior to discharge:  Yes  Patient able to tolerate oral intake:  Yes  Pain has adequate pain control using Oral analgesics:  Yes  Does patient have an identified :  Yes  Has goal D/C date and time been discussed with patient:  Yes    Pain controlled with prn Oxycodone, scheduled Tylenol and ice packs. Up with assist of 1, gait belt, and walker. Plan is to discharge to TCU tomorrow.

## 2023-07-27 NOTE — PROGRESS NOTES
CM working on OBRa LEVEL II. Remi bazzi aware, Carmelita Campuzano is her DD CM, and working on it today. Hopefully she will be able to go to TCU tomorrow. [facility] has accepted her. Will need a wheel chair ride set up for transport. Sister very involved.     Maylin Luis RN

## 2023-07-27 NOTE — PROGRESS NOTES
07/27/23 1000   Appointment Info   Signing Clinician's Name / Credentials (PT) Hayden Alford, PT, DPT   Quick Adds   Quick Adds Certification   Living Environment   People in Home facility resident   Current Living Arrangements group home   Home Accessibility stairs to enter home   Number of Stairs, Main Entrance 1;2   Number of Stairs, Within Home, Primary none   Self-Care   Usual Activity Tolerance moderate   Current Activity Tolerance fair   Equipment Currently Used at Home cane, straight   Fall history within last six months no   General Information   Onset of Illness/Injury or Date of Surgery 07/26/23   Referring Physician Dr. Miller   Patient/Family Therapy Goals Statement (PT) Decrease pain with mobility   Pertinent History of Current Problem (include personal factors and/or comorbidities that impact the POC) s/p TKA   Existing Precautions/Restrictions weight bearing   Weight-Bearing Status - LLE weight-bearing as tolerated   Weight-Bearing Status - RLE full weight-bearing   Range of Motion (ROM)   ROM Comment WFL, decreased LLE s/p TKA   Strength (Manual Muscle Testing)   Strength Comments WFL   Transfers   Transfers sit-stand transfer   Sit-Stand Transfer   Sit-Stand [place] (Transfers) minimum assist (75% patient effort);contact guard   Assistive Device (Sit-Stand Transfers) walker, front-wheeled   Gait/Stairs (Locomotion)   [place] Level (Gait) contact guard   Assistive Device (Gait) walker, front-wheeled   Distance in Feet 10'   Distance in Feet (Gait) 5', 10', 5', 20'   Pattern (Gait) step-to   Deviations/Abnormal Patterns (Gait) antalgic;neo decreased;stride length decreased   Clinical Impression   Criteria for Skilled Therapeutic Intervention Yes, treatment indicated   PT Diagnosis (PT) Impaired functional mobility   Influenced by the following impairments Weakness, pain   Functional limitations due to impairments Transfers, gait, stairs   Clinical Presentation (PT Evaluation  Complexity) Stable/Uncomplicated   Clinical Presentation Rationale Pt presents as medically diagnosed   Clinical Decision Making (Complexity) moderate complexity   Planned Therapy Interventions (PT) gait training;home exercise program;patient/family education;ROM (range of motion);stair training;strengthening;transfer training   Anticipated Equipment Needs at Discharge (PT) walker, rolling   Risk & Benefits of therapy have been explained care plan/treatment goals reviewed;patient;sibling   PT Total Evaluation Time   PT Eval, Moderate Complexity Minutes (40915) 10   Plan of Care Review   Plan of Care Reviewed With patient;sibling   Therapy Certification   Start of care date 07/27/23   Certification date from 07/27/23   Certification date to 08/27/23   Medical Diagnosis s/p TKA   Physical Therapy Goals   PT Frequency Daily   PT Predicted Duration/Target Date for Goal Attainment 08/04/23   PT Goals Transfers;Gait;Stairs;PT Goal 1   PT: Transfers Supervision/stand-by assist;Sit to/from stand   PT: Gait Supervision/stand-by assist;Rolling walker;100 feet   PT: Stairs Supervision/stand-by assist;2 stairs;Rail on both sides   PT: Goal 1 Mod I wiith TKA HEP with cues as needed   Interventions   Interventions Quick Adds Gait Training;Therapeutic Activity   Therapeutic Activity   Therapeutic Activities: dynamic activities to improve functional performance Minutes (94909) 15   Symptoms Noted During/After Treatment Fatigue;Increased pain   Treatment Detail/Skilled Intervention Sit<>stand from commode Min A of 1, dependent with toileting, taking a few short steps back to chair. Sit<>stand x4 throughout rest of session Min A-CGA, increased time needed for cues and set up. Encouragement needed for participation and continuation of therapy.   Gait Training   Gait Training Minutes (80259) 10   Symptoms Noted During/After Treatment (Gait Training) fatigue;increased pain   Treatment Detail/Skilled Intervention Pt rating pain 6/10  before and after therapy, able to amb short distances CGA with FWW, moving slowly with gait deficits below but no LOB or adverse s/s. 3 rest breaks needed. Increased amb towards end of session. Encouraged to mobilize as able with nursing.   [place] Level (Gait Training) contact guard   Physical Assistance Level (Gait Training) supervision;verbal cues   Weight Bearing (Gait Training) weight-bearing as tolerated   Assistive Device (Gait Training) rolling walker   Pattern Analysis (Gait Training) swing-to gait   Gait Analysis Deviations decreased neo;increased time in double stance;decreased step length;decreased weight-shifting ability   Impairments (Gait Analysis/Training) pain;strength decreased   PT Discharge Planning   PT Plan Amb, transfers, TKA HEP, stairs if needed   PT Discharge Recommendation (DC Rec)   (Defer to ortho team)   PT Rationale for DC Rec Pt amb short distances, transferring well with increased time, no stairs yet, lives at group home.   PT Brief overview of current status Min A-CGA for mobility with transfers and short bouts of amb 15'

## 2023-07-27 NOTE — CONSULTS
Care Management Initial Consult    General Information  Assessment completed with: Patient, Family,  (sister, guardian)  Type of CM/SW Visit: Offer D/C Planning    Primary Care Provider verified and updated as needed:     Readmission within the last 30 days: no previous admission in last 30 days      Reason for Consult: discharge planning  Advance Care Planning:            Communication Assessment  Patient's communication style: spoken language (English or Bilingual)    Hearing Difficulty or Deaf: yes   Wear Glasses or Blind: yes    Cognitive  Cognitive/Neuro/Behavioral: WDL  Level of Consciousness: alert  Arousal Level: opens eyes spontaneously                Living Environment:   People in home: other (see comments) (lives in group home.)     Current living Arrangements: group home ([facility])      Able to return to prior arrangements: no       Family/Social Support:  Care provided by: homecare agency, other (see comments) (sister)  Provides care for: no one     Sibling(s), Facility resident(s)/Staff          Description of Support System: Supportive, Involved    Support Assessment: Adequate family and caregiver support    Current Resources:   Patient receiving home care services:  Patient lives [facility] group home[facility].She will need a TCU at discharge as her needs may be too high to go home right now. She does have 24/7 care at group home.        Equipment currently used at home: cane, straight  Supplies currently used at home:         Does the patient's insurance plan have a 3 day qualifying hospital stay waiver?  No    Lifestyle & Psychosocial Needs:  Social Determinants of Health     Tobacco Use: Medium Risk (7/26/2023)    Patient History     Smoking Tobacco Use: Former     Smokeless Tobacco Use: Never     Passive Exposure: Not on file   Alcohol Use: Not on file   Financial Resource Strain: Not on file   Food Insecurity: Not on file   Transportation Needs: Not on file   Physical Activity: Not on file   Stress:  Not on file   Social Connections: Not on file   Intimate Partner Violence: Not on file   Depression: Not on file   Housing Stability: Not on file       Functional Status:  Prior to admission patient needed assistance:      Dependent IADLs:: Cleaning, Cooking, Laundry, Shopping, Meal Preparation, Medication Management, Money Management, Transportation  Additional Information:  CM met with patient and sister who is her guardian. Patient and sister both agree that TCU would be appropriate. Choices given, referrals sent. [facility] in [place] reviewing and may be able to accept, tomorrow anticipated as her PAS will trigger a obra II. Patient and sister updated.   Ride TBD on how patient is moving. Sister may be able to transport.     Maylin Luis RN     [id number]--[id number] an OBRA II

## 2023-07-28 ENCOUNTER — APPOINTMENT (OUTPATIENT)
Dept: PHYSICAL THERAPY | Facility: CLINIC | Age: 68
End: 2023-07-28
Attending: ORTHOPAEDIC SURGERY
Payer: COMMERCIAL

## 2023-07-28 VITALS
WEIGHT: 224 LBS | SYSTOLIC BLOOD PRESSURE: 106 MMHG | HEART RATE: 87 BPM | DIASTOLIC BLOOD PRESSURE: 56 MMHG | BODY MASS INDEX: 36 KG/M2 | TEMPERATURE: 97.5 F | RESPIRATION RATE: 18 BRPM | OXYGEN SATURATION: 92 % | HEIGHT: 66 IN

## 2023-07-28 LAB
FASTING STATUS PATIENT QL REPORTED: NO
GLUCOSE BLD-MCNC: 116 MG/DL (ref 70–125)
HGB BLD-MCNC: 10.8 G/DL (ref 11.7–15.7)

## 2023-07-28 PROCEDURE — 82947 ASSAY GLUCOSE BLOOD QUANT: CPT | Performed by: ORTHOPAEDIC SURGERY

## 2023-07-28 PROCEDURE — 85018 HEMOGLOBIN: CPT | Performed by: PHYSICIAN ASSISTANT

## 2023-07-28 PROCEDURE — 36415 COLL VENOUS BLD VENIPUNCTURE: CPT | Performed by: PHYSICIAN ASSISTANT

## 2023-07-28 PROCEDURE — 250N000013 HC RX MED GY IP 250 OP 250 PS 637: Performed by: HOSPITALIST

## 2023-07-28 PROCEDURE — 97530 THERAPEUTIC ACTIVITIES: CPT | Mod: GP

## 2023-07-28 PROCEDURE — 250N000013 HC RX MED GY IP 250 OP 250 PS 637: Performed by: INTERNAL MEDICINE

## 2023-07-28 PROCEDURE — 250N000013 HC RX MED GY IP 250 OP 250 PS 637: Performed by: PHYSICIAN ASSISTANT

## 2023-07-28 RX ADMIN — OXYCODONE HYDROCHLORIDE 5 MG: 5 TABLET ORAL at 12:53

## 2023-07-28 RX ADMIN — MIRABEGRON 50 MG: 25 TABLET, FILM COATED, EXTENDED RELEASE ORAL at 07:47

## 2023-07-28 RX ADMIN — Medication 2 SPRAY: at 07:47

## 2023-07-28 RX ADMIN — Medication 50 MG: at 07:46

## 2023-07-28 RX ADMIN — OXYCODONE HYDROCHLORIDE 5 MG: 5 TABLET ORAL at 08:54

## 2023-07-28 RX ADMIN — OXYCODONE HYDROCHLORIDE 5 MG: 5 TABLET ORAL at 07:44

## 2023-07-28 RX ADMIN — DULOXETINE HYDROCHLORIDE 120 MG: 60 CAPSULE, DELAYED RELEASE PELLETS ORAL at 07:44

## 2023-07-28 RX ADMIN — ACETAMINOPHEN 975 MG: 325 TABLET, FILM COATED ORAL at 07:44

## 2023-07-28 RX ADMIN — ACETAMINOPHEN 975 MG: 325 TABLET, FILM COATED ORAL at 00:40

## 2023-07-28 RX ADMIN — ATENOLOL 25 MG: 25 TABLET ORAL at 08:55

## 2023-07-28 RX ADMIN — Medication 25 MCG: at 07:47

## 2023-07-28 RX ADMIN — POLYETHYLENE GLYCOL 3350 17 G: 17 POWDER, FOR SOLUTION ORAL at 08:55

## 2023-07-28 RX ADMIN — OXYCODONE HYDROCHLORIDE 5 MG: 5 TABLET ORAL at 00:34

## 2023-07-28 RX ADMIN — PANTOPRAZOLE SODIUM 40 MG: 40 TABLET, DELAYED RELEASE ORAL at 07:44

## 2023-07-28 RX ADMIN — SENNOSIDES AND DOCUSATE SODIUM 1 TABLET: 50; 8.6 TABLET ORAL at 07:46

## 2023-07-28 RX ADMIN — ASPIRIN 81 MG: 81 TABLET, COATED ORAL at 08:55

## 2023-07-28 ASSESSMENT — ACTIVITIES OF DAILY LIVING (ADL)
ADLS_ACUITY_SCORE: 34
ADLS_ACUITY_SCORE: 35
ADLS_ACUITY_SCORE: 34
ADLS_ACUITY_SCORE: 35
ADLS_ACUITY_SCORE: 35

## 2023-07-28 NOTE — DISCHARGE SUMMARY
"ORTHOPEDIC DISCHARGE SUMMARY       Deepthi Gomez,  1955, MRN [id number]    Admission Date: 2023      Admission Diagnoses: Primary osteoarthritis of left knee [M17.12]  S/P total knee arthroplasty [Z96.659]     Discharge Date:  23     Post-operative Day:  2 Days Post-Op    Reason for Admission: The patient was admitted for the following: Procedure(s):  LEFT TOTAL KNEE ARTHROPLASTY    BRIEF HOSPITAL COURSE   Deepthi Gomez is a pleasant 68 year old female who underwent the aforementioned procedure with Dr. Miller on . There were no intraoperative complications and the patient was transferred to the recovery room and later the orthopedic unit in stable condition. Once the patient reached the orthopedic floor our orthopedic pain protocol was implemented along with the following:    Anticoagulation Medications: ASA  Therapy: PT and OT  Activity: WBAT  Bracing: None    Consultations during Admission: Hospitalist service for medical management     COMPLICATIONS/SIGNIFICANT FINDINGS    NONE    DISCHARGE INFORMATION   Condition at discharge: Good  Discharge destination: Skilled nursing facility  Patient was seen by myself on the date of discharge.    FOLLOW UP CARE   Follow up with orthopedics in 2 weeks or sooner should the need arise. Ortho will continue to manage pain control, post op anticoagulation and incision care.     Follow up with your PCP for management of chronic medical problems and to evaluate for post op medical complications including constipation, nausea/vomiting, DVT/PE, anemia, changes in blood pressure, fevers/chills, urinary retention and atelectasis/pneumonia.     Subjective   Patient is doing well on POD #1. Pain is well controlled with oral medications. Ambulating. Tolerating oral intake.     Physical Exam   /56 (BP Location: Right arm)   Pulse 87   Temp 97.5  F (36.4  C) (Oral)   Resp 18   Ht 1.676 m (5' 6\")   Wt 101.6 kg (224 lb)   SpO2 92%   BMI 36.15 " kg/m    The patient is A&Ox3. Appears comfortable, sitting up at bedside.  Sensation is intact to light touch & equal bilaterally in the L2 through S1 dermatomes.  Calves are soft and non-tender.  Dorsiflexion and plantar flexion is intact bilaterally.  Appropriate flexion and extension of the toes bilaterally.   Brisk capillary refill in the toes bilaterally.   Palpable left dorsalis pedis pulse.  left knee dressing C/D/I.    Pertinent Results at Discharge     Hemoglobin   Date/Time Value Ref Range Status   07/28/2023 07:23 AM 10.8 (L) 11.7 - 15.7 g/dL Final   07/27/2023 07:16 AM 9.5 (L) 11.7 - 15.7 g/dL Final   09/24/2020 05:51 AM 9.7 (L) 12.0 - 16.0 g/dL Final     INR   Date/Time Value Ref Range Status   09/24/2020 05:51 AM 1.20 (H) 0.90 - 1.10 Final   09/23/2020 06:22 AM 1.10 0.90 - 1.10 Final   09/22/2020 10:25 AM 1.08 0.90 - 1.10 Final       Problem List   Principal Problem:    S/P total knee arthroplasty  Active Problems:    LUC (generalized anxiety disorder)    Moderate intellectual disabilities    Neurogenic bladder    Sleep apnea    S/p nephrectomy    S/P total knee arthroplasty, right    Left knee DJD    Lives in group home    Congenital hydrocephalus (H)    Developmental delay      Chica Frankel PA-C/[facility]  [phone]  Date: 7/28/2023  Time: 10:57 AM

## 2023-07-28 NOTE — PROGRESS NOTES
"Orthopedic Progress Note      Assessment: 2 Day Post-Op  S/P Procedure(s):  LEFT TOTAL KNEE ARTHROPLASTY @    Plan:   - Continue PT/OT.   - Weightbearing status: WBAT.  - Anticoagulation: ASAin addition to SCDs, alejandro stockings and early ambulation.  - Discharge planning: Discharge to [facility]CU [facility]     Subjective:  Pain: Well controlled on Tylenol & oxycodone.  Nausea, Vomiting:  No  Chest pain: No  Lightheadedness, Dizziness:  No  Neuro:  Patient denies new onset numbness or paresthesias in left lower extremity     Patient is doing well on POD #2. Ambulating, tolerating oral intake, voiding & pain is controlled with oral medication. Doing well today     Objective:  /56 (BP Location: Right arm)   Pulse 87   Temp 97.5  F (36.4  C) (Oral)   Resp 18   Ht 1.676 m (5' 6\")   Wt 101.6 kg (224 lb)   SpO2 92%   BMI 36.15 kg/m    The patient is A&Ox3. Appears comfortable, sitting up at bedside.  Sensation is intact to light touch & equal bilaterally in the L2 through S1 dermatomes.  Calves are soft and non-tender.  Dorsiflexion and plantar flexion is intact bilaterally.  Appropriate flexion and extension of the toes bilaterally.   Brisk capillary refill in the toes bilaterally.   Palpable left dorsalis pedis pulse.  left knee dressing C/D/I.      Pertinent Labs   Lab Results: personally reviewed.   Lab Results   Component Value Date    INR 1.20 (H) 09/24/2020    INR 1.10 09/23/2020    INR 1.08 09/22/2020     Lab Results   Component Value Date    HGB 10.8 (L) 07/28/2023     Lab Results   Component Value Date     09/23/2020    CO2 30 09/23/2020         Report completed by:  Chica Frankel PA-C/Dr. Miller  [facility]    07/28/23     "

## 2023-07-28 NOTE — PLAN OF CARE
Patient vital signs are at baseline: Yes  Patient able to ambulate as they were prior to admission or with assist devices provided by therapies during their stay:  Yes  Patient MUST void prior to discharge:  Yes  Patient able to tolerate oral intake:  Yes  Pain has adequate pain control using Oral analgesics:  Yes  Does patient have an identified :  Yes  Has goal D/C date and time been discussed with patient:  Yes    Alert and oriented. Able to make needs known via call light. Sister has guardianship. Transfers well, needs some cues with walker at times. Pain controled with 5 mg oxycodone during shift. Slept well. Compliant with CPAP use. Dressing CDI. Ice packs in use. SCDs in use. VSS.

## 2023-07-28 NOTE — PROGRESS NOTES
Care Management Discharge Note    Discharge Date: 07/28/2023       Discharge Disposition: Transitional Care    Discharge Services: Transportation Services    Discharge DME: None    Discharge Transportation: agency    Private pay costs discussed: transportation costs    PAS Confirmation Code: [id number]    Education Provided on the Discharge Plan: Yes (AVS per bedside RN)    Persons Notified of Discharge Plans: patient and Oly (sister)    Patient/Family in Agreement with the Plan: yes    Handoff Referral Completed: Yes    Additional Information:  CM reviewed chart. CM met with patient and Oly (sister) bedside. Patient has been accepted at [facility] (). Patient and Oly are in agreement with this placement. Patient and Oly requesting that CM arrange for a wheelchair ride to TCU. CM did update patient and Oly that transportation may not be covered. [facility] wheelchair ride has been arranged for today with a pickup time of 1:07 pm- 1:52 pm. CM coordinating with MD, nursing, HUC and facility. [facility] () called and updated CM that OBRA Level 2 has been approved and patient is ok to discharge to TCU today.       CM faxed discharge orders. 11:02 AM       Desi Brown RN

## 2023-07-28 NOTE — PLAN OF CARE
Discharge AVS given to family in the room. Patient was addressed prior to discharge. Patient transported to wheelchair and brought to facility via transport. Family will transport patient belongings.

## 2023-07-31 NOTE — PROGRESS NOTES
[facility] GERIATRICS  INITIAL VISIT NOTE  July 31, 2023    PRIMARY CARE PROVIDER AND CLINIC: Vandana Boothe [address] / [place] [address]    [facility] Medical Record Number: [id number]  Place of Service where encounter took place: [facility] ([facility]) [[id number]]    Chief Complaint   Patient presents with    Hospital F/U     [facility] 7/26/2023 - 7/28/2023     HPI:    Deepthi Gomez is a 68 year old (1955) female was admitted to the above facility from [facility]. Hospital stay 7/26/23 through 7/28/23 where they were admitted for left TKA. Now admitted to this facility for rehab, medical management, and nursing care.      History obtained from: facility chart records, facility staff, patient report, [facility] chart review, and Care Everywhere [facility] chart review.      Brief Hospital Course: PMH of LUC, congenital hydrocephalus, intellectual disability, sleep apnea who underwent elective left TKA. Surgery was without complication. Pain controlled. Hgb 9.5 post-op, improved to 10.8 by time of discharge. Was evaluated by therapy who recommended TCU. When medically stable was discharged to [facility] for further rehab and medical management. She is lives in group home, her sister is her guardian.     TCU Course: Seen today mid morning, she slept in so had not yet had breakfast. She has been picking some at her dressing, says she wonders what it looks like underneath her dressing. Did show her a picture from the internet of what the incision likely looks like, and she said she thinks she can stop picking at it now. She has some pain with walking, but denied any pain when provider saw her while sitting up in wheelchair. Denies any SOB. Has been using her CPAP, but sometimes forgets to put it back on after she gets up to use the bathroom. She had  BM this AM. Appetite has been good.     Spoke with sister who came to facility during lunch time. She has rented a  machine to allow for compression and icing of her knee, and would like it to be used at least 3 times a day to help with pain and reduce swelling. She also wants to make sure staff is helping Deepthi with her CPAP at night if needed.     CODE STATUS/ADVANCE DIRECTIVES: CPR/Full code     ALLERGIES:  Allergies   Allergen Reactions    Penicillins Rash    Sulfa Antibiotics Unknown     Patient does not remember reaction       PAST MEDICAL HISTORY:   Past Medical History:   Diagnosis Date    Adenomatous polyp of colon     Benign neoplasm of colon     Brain aneurysm     Bruxism     Cancer (H)     possible renal cancer    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)     Depressive disorder     Developmental delay     Diverticula of colon     DJD (degenerative joint disease)     LUC (generalized anxiety disorder)     Gastroesophageal reflux disease     Hydrocephalus (H)     Hypertension     Irritable bowel syndrome     Lateral epicondylitis     Left renal mass     Marfan's syndrome     Menopausal syndrome     MR (mental retardation)     Neural hearing loss     Neurogenic bladder     Obesity     Osteoarthritis of right shoulder     Psychosis (H)     S/p nephrectomy     SAH (subarachnoid hemorrhage) (H)     Seizures (H)     Sleep apnea     uses CPAP     PAST SURGICAL HISTORY:   Past Surgical History:   Procedure Laterality Date    ARTHROPLASTY KNEE Left 7/26/2023    Procedure: LEFT TOTAL KNEE ARTHROPLASTY;  Surgeon: Salvador Miller MD;  Location: [facility]    CEREBRAL ANEURYSM REPAIR      CRANIOTOMY      HYSTERECTOMY      JOINT REPLACEMENT Right     TSA    NEPHRECTOMY  2013    [facility] RECONSTR TOTAL SHOULDER IMPLANT Right 4/11/2017    Procedure: RIGHT TOTAL SHOULDER ARTHROPLASTY;  Surgeon: Salvador Miller MD;  Location: [facility];  Service: Orthopedics    [facility] TOTAL KNEE ARTHROPLASTY Right 9/22/2020    Procedure: RIGHT TOTAL KNEE ARTHROPLASTY;  Surgeon: Salvador Miller MD;  Location: [facility];  Service:  Orthopedics     FAMILY HISTORY:   History reviewed. No pertinent family history.  Unable to review due to cognitive impairment    SOCIAL HISTORY:   Patient's living condition:  lives in a group home    MEDICATIONS  Post Discharge Medication Reconciliation Status: discharge medications reconciled and changed, per note/orders.  Current Outpatient Medications   Medication Sig Dispense Refill    acetaminophen (TYLENOL) 500 MG tablet Take 2 tablets (1,000 mg) by mouth every 8 hours      oxyCODONE (ROXICODONE) 5 MG tablet Take 1 tablet (5 mg) by mouth every 4 hours as needed for moderate to severe pain 20 tablet 0    aspirin 81 MG EC tablet Take 1 tablet (81 mg) by mouth 2 times daily 60 tablet 0    atenolol (TENORMIN) 25 MG tablet Take 25 mg by mouth every morning      benzocaine (ORAJEL MAXIMUM STRENGTH) 20 % GEL gel Take by mouth 4 times daily as needed for mouth sores      calcium polycarbophil (FIBERCON) 625 MG tablet Take 2 tablets by mouth At Bedtime      cholecalciferol (VITAMIN D3) 25 mcg (1000 units) capsule Take 1 capsule by mouth every morning      DULoxetine (CYMBALTA) 60 MG capsule Take 120 mg by mouth every morning      fluticasone (FLONASE) 50 MCG/ACT nasal spray Spray 2 sprays into both nostrils every morning      lidocaine-prilocaine (EMLA) 2.5-2.5 % external cream Apply topically as needed for moderate pain      loperamide (IMODIUM) 2 MG capsule Take 2 mg by mouth 2 times daily as needed for diarrhea      melatonin 3 MG tablet Take 6 mg by mouth At Bedtime      metroNIDAZOLE (METROCREAM) 0.75 % cream Apply topically At Bedtime      mirabegron (MYRBETRIQ) 50 MG 24 hr tablet Take 50 mg by mouth every morning      multivitamin, therapeutic with minerals (THERA-VIT-M) TABS tablet Take 1 tablet by mouth every morning      Omega-3 Fatty Acids (FISH OIL OMEGA-3) 1000 MG CAPS Take 2 capsules by mouth At Bedtime      omeprazole 20 MG tablet Take 20 mg by mouth every morning (before breakfast)      risperiDONE  "(RISPERDAL) 4 MG tablet Take 4 mg by mouth daily At 6 PM      senna-docusate (SENOKOT-S/PERICOLACE) 8.6-50 MG tablet Take 1-2 tablets by mouth 2 times daily Take while on oral narcotics to prevent or treat constipation. 30 tablet 0    senna-docusate (SENOKOT-S;PERICOLACE) 8.6-50 MG per tablet Take 1 tablet by mouth every morning      zinc gluconate 50 MG tablet Take 50 mg by mouth daily (with breakfast)       ROS:  10 point ROS neg other than the symptoms noted above in the HPI.      PHYSICAL EXAM:  /79   Pulse 95   Temp 98.3  F (36.8  C)   Resp 24   Ht 1.676 m (5' 6\")   Wt 102.3 kg (225 lb 9.6 oz)   SpO2 96%   BMI 36.41 kg/m    Physical Exam  Cardiovascular:      Rate and Rhythm: Normal rate and regular rhythm.      Heart sounds: Normal heart sounds.   Pulmonary:      Effort: Pulmonary effort is normal.      Breath sounds: Normal breath sounds.   Abdominal:      General: Bowel sounds are normal.   Musculoskeletal:         General: Swelling (left knee) present.   Skin:     Comments: Left knee incision CDI, aquacel in place,    Neurological:      Mental Status: She is alert.   Psychiatric:         Mood and Affect: Mood normal.      Comments: Impaired memory and judgement          LABORATORY/IMAGING DATA:  Reviewed as per [facility] and/or [facility]    ASSESSMENT/PLAN:  Aftercare following left knee joint replacement surgery  Primary osteoarthritis of left knee  Physical deconditioning  Surgery without complication. Pain controlled.  Discussed with patient, nursing staff, patient's sister, therapy.   - analgesia with APAP (change from PRN to 1000mg TID), oxycodone 5mg q4h PRN  - ASA 81mg BID x 30 days for DVT ppx   - PT/OT  - WBAT  - follow-up with ortho in 2 weeks    Congenital hydrocephalus (H)  Developmental delay  SLUMS 14/30, lives in a group home. Fair historian during visit today. Sister is her guardian  - supportive cares    HTN  //60, HR 80-90 in TCU  - continue atenolol  25mg daily "   - monitor and adjust     S/p nephrectomy  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)  Renal function stable. Known issue that I take into account for their medical decisions, no current exacerbations or new concerns    Atypical depression  LUC (generalized anxiety disorder)  Borderline personality disorder (H)  Follows with psychiatry last visit 5/11/23, does have history of psychosis. Does seen a therapist.   - continue risperidone 4mg at bedtime, duloxetine 120mg daily, melatonin.   - supportive cares     Sleep apnea, unspecified type  Follows with pulmonology  - CPAP at home settings, staff to assist as needed     Marfan's syndrome  Known issue that I take into account for their medical decisions, no current exacerbations or new concerns    Anemia due to blood loss, acute  Hgb 13.2 ->9.5->10.8  - Hgb PRN    Drug induced constipation  In the setting of narcotics  - senna -s 1 tab BID, Fibercon at bedtime   - monitor and adjust      Orders:   Change Tylenol to 1000mg TID  Use family supplied ice/compression machine to left knee TID and PRN  CPAP at home settings    Total time spent with patient visit at the skilled nursing facility was 45 min including patient visit, review of past records, and after visit discussion with patients sister. Total time spent reviewing records from hospitalization within my organization including review of labs, reviewed PCP note, pulmonology, and psychiatry notes from outside facility, review of TCU facility records, medication reconciliation discussion of plan of care with nursing staff and therapy, time spent on documentation as well as discussion with patient including review of medications, discussion of plan of care and patient education as stated above.       Electronically signed by:  ALESHA Conti CNP

## 2023-08-01 ENCOUNTER — TRANSITIONAL CARE UNIT VISIT (OUTPATIENT)
Dept: GERIATRICS | Facility: CLINIC | Age: 68
End: 2023-08-01
Payer: COMMERCIAL

## 2023-08-01 VITALS
SYSTOLIC BLOOD PRESSURE: 124 MMHG | HEIGHT: 66 IN | RESPIRATION RATE: 24 BRPM | DIASTOLIC BLOOD PRESSURE: 79 MMHG | HEART RATE: 95 BPM | TEMPERATURE: 98.3 F | BODY MASS INDEX: 36.26 KG/M2 | OXYGEN SATURATION: 96 % | WEIGHT: 225.6 LBS

## 2023-08-01 DIAGNOSIS — Q87.40 MARFAN'S SYNDROME: ICD-10-CM

## 2023-08-01 DIAGNOSIS — G47.33 OSA (OBSTRUCTIVE SLEEP APNEA): ICD-10-CM

## 2023-08-01 DIAGNOSIS — M17.12 PRIMARY OSTEOARTHRITIS OF LEFT KNEE: Chronic | ICD-10-CM

## 2023-08-01 DIAGNOSIS — R53.81 PHYSICAL DECONDITIONING: ICD-10-CM

## 2023-08-01 DIAGNOSIS — Z90.5 S/P NEPHRECTOMY: ICD-10-CM

## 2023-08-01 DIAGNOSIS — Z47.1 AFTERCARE FOLLOWING LEFT KNEE JOINT REPLACEMENT SURGERY: Primary | ICD-10-CM

## 2023-08-01 DIAGNOSIS — F41.1 GAD (GENERALIZED ANXIETY DISORDER): ICD-10-CM

## 2023-08-01 DIAGNOSIS — F60.3 BORDERLINE PERSONALITY DISORDER (H): ICD-10-CM

## 2023-08-01 DIAGNOSIS — D30.00: ICD-10-CM

## 2023-08-01 DIAGNOSIS — I10 PRIMARY HYPERTENSION: ICD-10-CM

## 2023-08-01 DIAGNOSIS — Z96.652 AFTERCARE FOLLOWING LEFT KNEE JOINT REPLACEMENT SURGERY: Primary | ICD-10-CM

## 2023-08-01 DIAGNOSIS — D62 ANEMIA DUE TO BLOOD LOSS, ACUTE: ICD-10-CM

## 2023-08-01 DIAGNOSIS — R62.50 DEVELOPMENTAL DELAY: ICD-10-CM

## 2023-08-01 DIAGNOSIS — K59.03 DRUG INDUCED CONSTIPATION: ICD-10-CM

## 2023-08-01 DIAGNOSIS — Q03.9 CONGENITAL HYDROCEPHALUS (H): ICD-10-CM

## 2023-08-01 DIAGNOSIS — F32.89 ATYPICAL DEPRESSION: ICD-10-CM

## 2023-08-01 PROCEDURE — 99310 SBSQ NF CARE HIGH MDM 45: CPT | Performed by: NURSE PRACTITIONER

## 2023-08-01 RX ORDER — AMOXICILLIN 250 MG
1 CAPSULE ORAL 2 TIMES DAILY
Qty: 30 TABLET | Refills: 0
Start: 2023-08-01 | End: 2023-08-04

## 2023-08-01 RX ORDER — ACETAMINOPHEN 500 MG
1000 TABLET ORAL EVERY 8 HOURS
Start: 2023-08-01

## 2023-08-01 RX ORDER — OXYCODONE HYDROCHLORIDE 5 MG/1
5 TABLET ORAL EVERY 4 HOURS PRN
Qty: 20 TABLET | Refills: 0 | Status: SHIPPED | OUTPATIENT
Start: 2023-08-01 | End: 2023-08-04

## 2023-08-01 NOTE — LETTER
8/1/2023        RE: Deepthi Gomez  [address]  [place] [address]        [facility] GERIATRICS  INITIAL VISIT NOTE  July 31, 2023    PRIMARY CARE PROVIDER AND CLINIC: Vandana Boothe [facility] / [place] [address]    [facility] Medical Record Number: [id number]  Place of Service where encounter took place: [facility] ([facility]) [[id number]]    Chief Complaint   Patient presents with     Hospital F/U     [facility] 7/26/2023 - 7/28/2023     HPI:    Deepthi Gomez is a 68 year old (1955) female was admitted to the above facility from [facility]. Hospital stay 7/26/23 through 7/28/23 where they were admitted for left TKA. Now admitted to this facility for rehab, medical management, and nursing care.      History obtained from: facility chart records, facility staff, patient report, [facility] chart review, and Care Everywhere [facility] chart review.      Brief Hospital Course: PMH of LUC, congenital hydrocephalus, intellectual disability, sleep apnea who underwent elective left TKA. Surgery was without complication. Pain controlled. Hgb 9.5 post-op, improved to 10.8 by time of discharge. Was evaluated by therapy who recommended TCU. When medically stable was discharged to [facility] for further rehab and medical management. She is lives in group home, her sister is her guardian.     TCU Course: Seen today mid morning, she slept in so had not yet had breakfast. She has been picking some at her dressing, says she wonders what it looks like underneath her dressing. Did show her a picture from the internet of what the incision likely looks like, and she said she thinks she can stop picking at it now. She has some pain with walking, but denied any pain when provider saw her while sitting up in wheelchair. Denies any SOB. Has been using her CPAP, but sometimes forgets to put it back on after she gets up to use the bathroom. She had  BM this AM. Appetite has  been good.     Spoke with sister who came to facility during lunch time. She has rented a machine to allow for compression and icing of her knee, and would like it to be used at least 3 times a day to help with pain and reduce swelling. She also wants to make sure staff is helping Deepthi with her CPAP at night if needed.     CODE STATUS/ADVANCE DIRECTIVES: CPR/Full code     ALLERGIES:  Allergies   Allergen Reactions     Penicillins Rash     Sulfa Antibiotics Unknown     Patient does not remember reaction       PAST MEDICAL HISTORY:   Past Medical History:   Diagnosis Date     Adenomatous polyp of colon      Benign neoplasm of colon      Brain aneurysm      Bruxism      Cancer (H)     possible renal cancer     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)      Depressive disorder      Developmental delay      Diverticula of colon      DJD (degenerative joint disease)      LUC (generalized anxiety disorder)      Gastroesophageal reflux disease      Hydrocephalus (H)      Hypertension      Irritable bowel syndrome      Lateral epicondylitis      Left renal mass      Marfan's syndrome      Menopausal syndrome      MR (mental retardation)      Neural hearing loss      Neurogenic bladder      Obesity      Osteoarthritis of right shoulder      Psychosis (H)      S/p nephrectomy      SAH (subarachnoid hemorrhage) (H)      Seizures (H)      Sleep apnea     uses CPAP     PAST SURGICAL HISTORY:   Past Surgical History:   Procedure Laterality Date     ARTHROPLASTY KNEE Left 7/26/2023    Procedure: LEFT TOTAL KNEE ARTHROPLASTY;  Surgeon: Salvador Miller MD;  Location: [facility]     CEREBRAL ANEURYSM REPAIR       CRANIOTOMY       HYSTERECTOMY       JOINT REPLACEMENT Right     TSA     NEPHRECTOMY  2013     [facility] RECONSTR TOTAL SHOULDER IMPLANT Right 4/11/2017    Procedure: RIGHT TOTAL SHOULDER ARTHROPLASTY;  Surgeon: Salvador Miller MD;  Location: [facility];  Service: Orthopedics     [facility] TOTAL KNEE ARTHROPLASTY Right  9/22/2020    Procedure: RIGHT TOTAL KNEE ARTHROPLASTY;  Surgeon: Salvador Miller MD;  Location: [facility];  Service: Orthopedics     FAMILY HISTORY:   History reviewed. No pertinent family history.  Unable to review due to cognitive impairment    SOCIAL HISTORY:   Patient's living condition:  lives in a group home    MEDICATIONS  Post Discharge Medication Reconciliation Status: discharge medications reconciled and changed, per note/orders.  Current Outpatient Medications   Medication Sig Dispense Refill     acetaminophen (TYLENOL) 500 MG tablet Take 2 tablets (1,000 mg) by mouth every 8 hours       oxyCODONE (ROXICODONE) 5 MG tablet Take 1 tablet (5 mg) by mouth every 4 hours as needed for moderate to severe pain 20 tablet 0     aspirin 81 MG EC tablet Take 1 tablet (81 mg) by mouth 2 times daily 60 tablet 0     atenolol (TENORMIN) 25 MG tablet Take 25 mg by mouth every morning       benzocaine (ORAJEL MAXIMUM STRENGTH) 20 % GEL gel Take by mouth 4 times daily as needed for mouth sores       calcium polycarbophil (FIBERCON) 625 MG tablet Take 2 tablets by mouth At Bedtime       cholecalciferol (VITAMIN D3) 25 mcg (1000 units) capsule Take 1 capsule by mouth every morning       DULoxetine (CYMBALTA) 60 MG capsule Take 120 mg by mouth every morning       fluticasone (FLONASE) 50 MCG/ACT nasal spray Spray 2 sprays into both nostrils every morning       lidocaine-prilocaine (EMLA) 2.5-2.5 % external cream Apply topically as needed for moderate pain       loperamide (IMODIUM) 2 MG capsule Take 2 mg by mouth 2 times daily as needed for diarrhea       melatonin 3 MG tablet Take 6 mg by mouth At Bedtime       metroNIDAZOLE (METROCREAM) 0.75 % cream Apply topically At Bedtime       mirabegron (MYRBETRIQ) 50 MG 24 hr tablet Take 50 mg by mouth every morning       multivitamin, therapeutic with minerals (THERA-VIT-M) TABS tablet Take 1 tablet by mouth every morning       Omega-3 Fatty Acids (FISH OIL OMEGA-3) 1000 MG  "CAPS Take 2 capsules by mouth At Bedtime       omeprazole 20 MG tablet Take 20 mg by mouth every morning (before breakfast)       risperiDONE (RISPERDAL) 4 MG tablet Take 4 mg by mouth daily At 6 PM       senna-docusate (SENOKOT-S/PERICOLACE) 8.6-50 MG tablet Take 1-2 tablets by mouth 2 times daily Take while on oral narcotics to prevent or treat constipation. 30 tablet 0     senna-docusate (SENOKOT-S;PERICOLACE) 8.6-50 MG per tablet Take 1 tablet by mouth every morning       zinc gluconate 50 MG tablet Take 50 mg by mouth daily (with breakfast)       ROS:  10 point ROS neg other than the symptoms noted above in the HPI.      PHYSICAL EXAM:  /79   Pulse 95   Temp 98.3  F (36.8  C)   Resp 24   Ht 1.676 m (5' 6\")   Wt 102.3 kg (225 lb 9.6 oz)   SpO2 96%   BMI 36.41 kg/m    Physical Exam  Cardiovascular:      Rate and Rhythm: Normal rate and regular rhythm.      Heart sounds: Normal heart sounds.   Pulmonary:      Effort: Pulmonary effort is normal.      Breath sounds: Normal breath sounds.   Abdominal:      General: Bowel sounds are normal.   Musculoskeletal:         General: Swelling (left knee) present.   Skin:     Comments: Left knee incision CDI, aquacel in place,    Neurological:      Mental Status: She is alert.   Psychiatric:         Mood and Affect: Mood normal.      Comments: Impaired memory and judgement          LABORATORY/IMAGING DATA:  Reviewed as per [facility] and/or [facility]    ASSESSMENT/PLAN:  Aftercare following left knee joint replacement surgery  Primary osteoarthritis of left knee  Physical deconditioning  Surgery without complication. Pain controlled.  Discussed with patient, nursing staff, patient's sister, therapy.   - analgesia with APAP (change from PRN to 1000mg TID), oxycodone 5mg q4h PRN  - ASA 81mg BID x 30 days for DVT ppx   - PT/OT  - WBAT  - follow-up with ortho in 2 weeks    Congenital hydrocephalus (H)  Developmental delay  SLUMS 14/30, lives in a group home. Fair " historian during visit today. Sister is her guardian  - supportive cares    HTN  //60, HR 80-90 in TCU  - continue atenolol  25mg daily   - monitor and adjust     S/p nephrectomy  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)  Renal function stable. Known issue that I take into account for their medical decisions, no current exacerbations or new concerns    Atypical depression  LUC (generalized anxiety disorder)  Borderline personality disorder (H)  Follows with psychiatry last visit 5/11/23, does have history of psychosis. Does seen a therapist.   - continue risperidone 4mg at bedtime, duloxetine 120mg daily, melatonin.   - supportive cares     Sleep apnea, unspecified type  Follows with pulmonology  - CPAP at home settings, staff to assist as needed     Marfan's syndrome  Known issue that I take into account for their medical decisions, no current exacerbations or new concerns    Anemia due to blood loss, acute  Hgb 13.2 ->9.5->10.8  - Hgb PRN    Drug induced constipation  In the setting of narcotics  - senna -s 1 tab BID, Fibercon at bedtime   - monitor and adjust      Orders:   Change Tylenol to 1000mg TID  Use family supplied ice/compression machine to left knee TID and PRN  CPAP at home settings    Total time spent with patient visit at the skilled nursing facility was 45 min including patient visit, review of past records, and after visit discussion with patients sister. Total time spent reviewing records from hospitalization within my organization including review of labs, reviewed PCP note, pulmonology, and psychiatry notes from outside facility, review of TCU facility records, medication reconciliation discussion of plan of care with nursing staff and therapy, time spent on documentation as well as discussion with patient including review of medications, discussion of plan of care and patient education as stated above.       Electronically signed by:  ALESHA Conti CNP        Sincerely,        ALESHA Conti CNP

## 2023-08-01 NOTE — PROGRESS NOTES
[facility] GERIATRIC SERVICES  PRIMARY CARE PROVIDER AND CLINIC:  RED SHAH, [address] / [place] [address]  Chief Complaint   Patient presents with     Hospital F/U     [facility] Medical Record Number:  [id number]  Place of Service where encounter took place:  [facility] [[id number]]    Deepthi Gomez  is a 68 year old  (1955), admitted to the above facility from  [facility]. Hospital stay 7/26/23 through 7/28/23..  Admitted to this facility for  rehab, medical management, and nursing care.    HPI:    HPI information obtained from: facility chart records, facility staff, patient report and [facility] chart review.   Brief Summary of Hospital Course:   Patient with PMH pertinent for LUC, congenital hydrocephalus with intellectual disability, CASSIE, left knee osteoarthritis hospitalized for elective left TKA.      Today:  - Left TKA: reports pain with therapy, better with meds. Pain is felt inside the joint.   - Rehab: going fine. Physical therapist reports working on strenght and ROM, pain is a hinder, hence, working on pre-medicate.   - Anemia: appetite is fine, denies fainting  =============================================    CODE STATUS/ADVANCE DIRECTIVES DISCUSSION:   CPR/Full code   Patient's living condition:  lives in a group home  ALLERGIES: Penicillins and Sulfa antibiotics  PAST MEDICAL HISTORY:  has a past medical history of Adenomatous polyp of colon, Benign neoplasm of colon, Brain aneurysm, Bruxism, Cancer (H), 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), Depressive disorder, Developmental delay, Diverticula of colon, DJD (degenerative joint disease), LUC (generalized anxiety disorder), Gastroesophageal reflux disease, Hydrocephalus (H), Hypertension, Irritable bowel syndrome, Lateral epicondylitis, Left renal mass, Marfan's syndrome, Menopausal syndrome, MR (mental retardation), Neural hearing loss, Neurogenic bladder, Obesity, Osteoarthritis of  right shoulder, Psychosis (H), S/p nephrectomy, SAH (subarachnoid hemorrhage) (H), Seizures (H), and Sleep apnea.    She has no past medical history of Cerebral artery occlusion with cerebral infarction (H), Cerebral infarction (H), Chronic infection, Congestive heart failure (H), COPD (chronic obstructive pulmonary disease) (H), Diabetes (H), Difficult intubation, Heart disease, History of blood transfusion, Malignant hyperthermia, Motion sickness, PONV (postoperative nausea and vomiting), Spinal headache, Thyroid disease, or Uncomplicated asthma.  PAST SURGICAL HISTORY:   has a past surgical history that includes Hysterectomy; Cerebral Aneurysm Repair; Nephrectomy (2013); RECONSTR TOTAL SHOULDER IMPLANT (Right, 4/11/2017); Craniotomy; joint replacement (Right); TOTAL KNEE ARTHROPLASTY (Right, 9/22/2020); and Arthroplasty knee (Left, 7/26/2023).  FAMILY HISTORY: family history is not on file.  SOCIAL HISTORY:   reports that she has quit smoking. She has never used smokeless tobacco. She reports that she does not drink alcohol and does not use drugs.    Post Discharge Medication Reconciliation Status: discharge medications reconciled and changed, per note/orders  Current Outpatient Medications   Medication Sig Dispense Refill     acetaminophen (TYLENOL) 500 MG tablet Take 2 tablets (1,000 mg) by mouth every 8 hours       aspirin 81 MG EC tablet Take 1 tablet (81 mg) by mouth 2 times daily 60 tablet 0     atenolol (TENORMIN) 25 MG tablet Take 25 mg by mouth every morning       benzocaine (ORAJEL MAXIMUM STRENGTH) 20 % GEL gel Take by mouth 4 times daily as needed for mouth sores       calcium polycarbophil (FIBERCON) 625 MG tablet Take 2 tablets by mouth At Bedtime       cholecalciferol (VITAMIN D3) 25 mcg (1000 units) capsule Take 1 capsule by mouth every morning       DULoxetine (CYMBALTA) 60 MG capsule Take 120 mg by mouth every morning       fluticasone (FLONASE) 50 MCG/ACT nasal spray Spray 2 sprays into both  "nostrils every morning       lidocaine-prilocaine (EMLA) 2.5-2.5 % external cream Apply topically as needed for moderate pain       loperamide (IMODIUM) 2 MG capsule Take 2 mg by mouth 2 times daily as needed for diarrhea       melatonin 3 MG tablet Take 6 mg by mouth At Bedtime       metroNIDAZOLE (METROCREAM) 0.75 % cream Apply topically At Bedtime       mirabegron (MYRBETRIQ) 50 MG 24 hr tablet Take 50 mg by mouth every morning       multivitamin, therapeutic with minerals (THERA-VIT-M) TABS tablet Take 1 tablet by mouth every morning       Omega-3 Fatty Acids (FISH OIL OMEGA-3) 1000 MG CAPS Take 2 capsules by mouth At Bedtime       omeprazole 20 MG tablet Take 20 mg by mouth every morning (before breakfast)       oxyCODONE (ROXICODONE) 5 MG tablet Take 1 tablet (5 mg) by mouth every 4 hours as needed for moderate to severe pain 20 tablet 0     risperiDONE (RISPERDAL) 4 MG tablet Take 4 mg by mouth daily At 6 PM       senna-docusate (SENOKOT-S/PERICOLACE) 8.6-50 MG tablet Take 1 tablet by mouth 2 times daily Take while on oral narcotics to prevent or treat constipation. 30 tablet 0     senna-docusate (SENOKOT-S;PERICOLACE) 8.6-50 MG per tablet Take 1 tablet by mouth every morning       zinc gluconate 50 MG tablet Take 50 mg by mouth daily (with breakfast)       ROS:  10 point ROS of systems including Constitutional, Eyes, Respiratory, Cardiovascular, Gastroenterology, Genitourinary, Integumentary, Musculoskeletal, Psychiatric were all negative except for pertinent positives noted in my HPI.    Vitals:  /79   Pulse 95   Temp 98.3  F (36.8  C)   Resp 24   Ht 1.676 m (5' 6\")   Wt 101.6 kg (224 lb)   SpO2 96%   BMI 36.15 kg/m    Exam:  GENERAL APPEARANCE:  in no distress,   RESP:  Unlabored breathing. CTA b/l.   CV:  S1S2 audible, regular HR, no murmur appreciated.   ABDOMEN:  soft, NT/ND, BS audible.   M/S:   Non pitting edema left leg. No tenderness.    SKIN:  Aquacel over left knee dry  NEURO:   No " NFD appreciated on observation.   PSYCH:  affect and mood normal      Lab/Diagnostic data: Reviewed in the chart and EHR.        ASSESSMENT/PLAN:  (Z47.1,  Z96.652) Aftercare following left knee joint replacement surgery  (primary encounter diagnosis)  (M17.12) Primary osteoarthritis of left knee  (R53.81) Physical deconditioning  - Analgesia optimal with the current regimen. Continue present plan and medications.  - Followed by Orthopedic Team. Follow on the recommendations / instructions.   - DVT prophylaxis according to Orthopedic team recommendations  - Started rehab program, making a progress, continue until desired goal is achieved.       (Q03.9) Congenital hydrocephalus (H)  (R62.50) Developmental delay  (F41.1) LUC (generalized anxiety disorder)  (F60.3) Borderline personality disorder (H)  - adjusting well. No behaviors concern.   - on multiple agents, continue. Psych routine follow up    (G47.33) CASSIE (obstructive sleep apnea)  - on cpap. No concern. Continue.       (D64.9) Anemia, unspecified type  - Hb stable. Clinically stable.       Electronically signed by:  Ev Waite MD

## 2023-08-02 ENCOUNTER — TRANSITIONAL CARE UNIT VISIT (OUTPATIENT)
Dept: GERIATRICS | Facility: CLINIC | Age: 68
End: 2023-08-02
Payer: COMMERCIAL

## 2023-08-02 VITALS
BODY MASS INDEX: 36 KG/M2 | WEIGHT: 224 LBS | RESPIRATION RATE: 24 BRPM | DIASTOLIC BLOOD PRESSURE: 79 MMHG | TEMPERATURE: 98.3 F | SYSTOLIC BLOOD PRESSURE: 124 MMHG | HEART RATE: 95 BPM | HEIGHT: 66 IN | OXYGEN SATURATION: 96 %

## 2023-08-02 DIAGNOSIS — R53.81 PHYSICAL DECONDITIONING: ICD-10-CM

## 2023-08-02 DIAGNOSIS — Z47.1 AFTERCARE FOLLOWING LEFT KNEE JOINT REPLACEMENT SURGERY: Primary | ICD-10-CM

## 2023-08-02 DIAGNOSIS — Z96.652 AFTERCARE FOLLOWING LEFT KNEE JOINT REPLACEMENT SURGERY: Primary | ICD-10-CM

## 2023-08-02 DIAGNOSIS — D64.9 ANEMIA, UNSPECIFIED TYPE: ICD-10-CM

## 2023-08-02 DIAGNOSIS — M17.12 PRIMARY OSTEOARTHRITIS OF LEFT KNEE: ICD-10-CM

## 2023-08-02 DIAGNOSIS — F41.1 GAD (GENERALIZED ANXIETY DISORDER): ICD-10-CM

## 2023-08-02 DIAGNOSIS — G47.33 OSA (OBSTRUCTIVE SLEEP APNEA): ICD-10-CM

## 2023-08-02 DIAGNOSIS — Q03.9 CONGENITAL HYDROCEPHALUS (H): ICD-10-CM

## 2023-08-02 DIAGNOSIS — R62.50 DEVELOPMENTAL DELAY: ICD-10-CM

## 2023-08-02 DIAGNOSIS — F60.3 BORDERLINE PERSONALITY DISORDER (H): ICD-10-CM

## 2023-08-02 PROCEDURE — 99305 1ST NF CARE MODERATE MDM 35: CPT | Performed by: FAMILY MEDICINE

## 2023-08-02 NOTE — LETTER
8/2/2023        RE: Deepthi Gomez  [address]  [facility] [address]        [place] [facility]  PRIMARY CARE PROVIDER AND CLINIC:  [facility], [address] [facility] / [place] [address]  Chief Complaint   Patient presents with     Hospital F/U     [place] Medical Record Number:  [id number]  Place of Service where encounter took place:  [facility] ([facility]) [[id number]]    Deepthi Gomez  is a 68 year old  (1955), admitted to the above facility from  [facility]. Hospital stay 7/26/23 through 7/28/23..  Admitted to this facility for  rehab, medical management, and nursing care.    HPI:    HPI information obtained from: facility chart records, facility staff, patient report and [facility] chart review.   Brief Summary of Hospital Course:   Patient with PMH pertinent for LUC, congenital hydrocephalus with intellectual disability, CASSIE, left knee osteoarthritis hospitalized for elective left TKA.      Today:  - Left TKA: reports pain with therapy, better with meds. Pain is felt inside the joint.   - Rehab: going fine. Physical therapist reports working on strenght and ROM, pain is a hinder, hence, working on pre-medicate.   - Anemia: appetite is fine, denies fainting  =============================================    CODE STATUS/ADVANCE DIRECTIVES DISCUSSION:   CPR/Full code   Patient's living condition:  lives in a group home  ALLERGIES: Penicillins and Sulfa antibiotics  PAST MEDICAL HISTORY:  has a past medical history of Adenomatous polyp of colon, Benign neoplasm of colon, Brain aneurysm, Bruxism, Cancer (H), 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), Depressive disorder, Developmental delay, Diverticula of colon, DJD (degenerative joint disease), LUC (generalized anxiety disorder), Gastroesophageal reflux disease, Hydrocephalus (H), Hypertension, Irritable bowel syndrome, Lateral epicondylitis, Left renal mass, Marfan's syndrome, Menopausal syndrome, MR  (mental retardation), Neural hearing loss, Neurogenic bladder, Obesity, Osteoarthritis of right shoulder, Psychosis (H), S/p nephrectomy, SAH (subarachnoid hemorrhage) (H), Seizures (H), and Sleep apnea.    She has no past medical history of Cerebral artery occlusion with cerebral infarction (H), Cerebral infarction (H), Chronic infection, Congestive heart failure (H), COPD (chronic obstructive pulmonary disease) (H), Diabetes (H), Difficult intubation, Heart disease, History of blood transfusion, Malignant hyperthermia, Motion sickness, PONV (postoperative nausea and vomiting), Spinal headache, Thyroid disease, or Uncomplicated asthma.  PAST SURGICAL HISTORY:   has a past surgical history that includes Hysterectomy; Cerebral Aneurysm Repair; Nephrectomy (2013); RECONSTR TOTAL SHOULDER IMPLANT (Right, 4/11/2017); Craniotomy; joint replacement (Right); TOTAL KNEE ARTHROPLASTY (Right, 9/22/2020); and Arthroplasty knee (Left, 7/26/2023).  FAMILY HISTORY: family history is not on file.  SOCIAL HISTORY:   reports that she has quit smoking. She has never used smokeless tobacco. She reports that she does not drink alcohol and does not use drugs.    Post Discharge Medication Reconciliation Status: discharge medications reconciled and changed, per note/orders  Current Outpatient Medications   Medication Sig Dispense Refill     acetaminophen (TYLENOL) 500 MG tablet Take 2 tablets (1,000 mg) by mouth every 8 hours       aspirin 81 MG EC tablet Take 1 tablet (81 mg) by mouth 2 times daily 60 tablet 0     atenolol (TENORMIN) 25 MG tablet Take 25 mg by mouth every morning       benzocaine (ORAJEL MAXIMUM STRENGTH) 20 % GEL gel Take by mouth 4 times daily as needed for mouth sores       calcium polycarbophil (FIBERCON) 625 MG tablet Take 2 tablets by mouth At Bedtime       cholecalciferol (VITAMIN D3) 25 mcg (1000 units) capsule Take 1 capsule by mouth every morning       DULoxetine (CYMBALTA) 60 MG capsule Take 120 mg by mouth  "every morning       fluticasone (FLONASE) 50 MCG/ACT nasal spray Spray 2 sprays into both nostrils every morning       lidocaine-prilocaine (EMLA) 2.5-2.5 % external cream Apply topically as needed for moderate pain       loperamide (IMODIUM) 2 MG capsule Take 2 mg by mouth 2 times daily as needed for diarrhea       melatonin 3 MG tablet Take 6 mg by mouth At Bedtime       metroNIDAZOLE (METROCREAM) 0.75 % cream Apply topically At Bedtime       mirabegron (MYRBETRIQ) 50 MG 24 hr tablet Take 50 mg by mouth every morning       multivitamin, therapeutic with minerals (THERA-VIT-M) TABS tablet Take 1 tablet by mouth every morning       Omega-3 Fatty Acids (FISH OIL OMEGA-3) 1000 MG CAPS Take 2 capsules by mouth At Bedtime       omeprazole 20 MG tablet Take 20 mg by mouth every morning (before breakfast)       oxyCODONE (ROXICODONE) 5 MG tablet Take 1 tablet (5 mg) by mouth every 4 hours as needed for moderate to severe pain 20 tablet 0     risperiDONE (RISPERDAL) 4 MG tablet Take 4 mg by mouth daily At 6 PM       senna-docusate (SENOKOT-S/PERICOLACE) 8.6-50 MG tablet Take 1 tablet by mouth 2 times daily Take while on oral narcotics to prevent or treat constipation. 30 tablet 0     senna-docusate (SENOKOT-S;PERICOLACE) 8.6-50 MG per tablet Take 1 tablet by mouth every morning       zinc gluconate 50 MG tablet Take 50 mg by mouth daily (with breakfast)       ROS:  10 point ROS of systems including Constitutional, Eyes, Respiratory, Cardiovascular, Gastroenterology, Genitourinary, Integumentary, Musculoskeletal, Psychiatric were all negative except for pertinent positives noted in my HPI.    Vitals:  /79   Pulse 95   Temp 98.3  F (36.8  C)   Resp 24   Ht 1.676 m (5' 6\")   Wt 101.6 kg (224 lb)   SpO2 96%   BMI 36.15 kg/m    Exam:  GENERAL APPEARANCE:  in no distress,   RESP:  Unlabored breathing. CTA b/l.   CV:  S1S2 audible, regular HR, no murmur appreciated.   ABDOMEN:  soft, NT/ND, BS audible.   M/S:   Non " pitting edema left leg. No tenderness.    SKIN:  Aquacel over left knee dry  NEURO:   No NFD appreciated on observation.   PSYCH:  affect and mood normal      Lab/Diagnostic data: Reviewed in the chart and EHR.        ASSESSMENT/PLAN:  (Z47.1,  Z96.652) Aftercare following left knee joint replacement surgery  (primary encounter diagnosis)  (M17.12) Primary osteoarthritis of left knee  (R53.81) Physical deconditioning  - Analgesia optimal with the current regimen. Continue present plan and medications.  - Followed by Orthopedic Team. Follow on the recommendations / instructions.   - DVT prophylaxis according to Orthopedic team recommendations  - Started rehab program, making a progress, continue until desired goal is achieved.       (Q03.9) Congenital hydrocephalus (H)  (R62.50) Developmental delay  (F41.1) LUC (generalized anxiety disorder)  (F60.3) Borderline personality disorder (H)  - adjusting well. No behaviors concern.   - on multiple agents, continue. Psych routine follow up    (G47.33) CASSIE (obstructive sleep apnea)  - on cpap. No concern. Continue.       (D64.9) Anemia, unspecified type  - Hb stable. Clinically stable.       Electronically signed by:  Ev Waite MD          Sincerely,        Ev Waite MD

## 2023-08-04 DIAGNOSIS — Z96.652 AFTERCARE FOLLOWING LEFT KNEE JOINT REPLACEMENT SURGERY: Primary | ICD-10-CM

## 2023-08-04 DIAGNOSIS — Z47.1 AFTERCARE FOLLOWING LEFT KNEE JOINT REPLACEMENT SURGERY: Primary | ICD-10-CM

## 2023-08-04 RX ORDER — OXYCODONE HYDROCHLORIDE 5 MG/1
5 TABLET ORAL EVERY 4 HOURS PRN
Qty: 30 TABLET | Refills: 0 | Status: SHIPPED | OUTPATIENT
Start: 2023-08-04

## 2023-08-07 NOTE — PROGRESS NOTES
[facility] GERIATRICS  ACUTE/EPISODIC VISIT    [facility] Medical Record Number: [id number]  Place of Service where encounter took place: [facility] ([facility]) [[id number]]    Chief Complaint   Patient presents with    RECHECK     HPI:    Deepthi Gomez is a 68 year old (1955), who is being seen today for an episodic care visit. HPI information obtained from: facility chart records, facility staff, patient report, and [facility] chart review.    Today's concern is: Recently hospitalized with elective left TKA. She is seen resting in bed today, she is using CPM. She reports knee gets sore, but pain has been controlled. She does have some swelling to her left knee, has been icing it. Staff report she has had some incontinence, they think because she is low going to the bathroom as sister reports she has some urgency at baseline. Appetite has been good. She told staff she does not want to return to her group home as she likes it better at the facility.     Prompt toileting TID  Leg swollen  Does not want to go back to group home    ALLERGIES:   Allergies   Allergen Reactions    Penicillins Rash    Sulfa Antibiotics Unknown     Patient does not remember reaction      MEDICATIONS:  Post Discharge Medication Reconciliation Status: medication reconcilation previously completed during another office visit.     Current Outpatient Medications   Medication Sig Dispense Refill    acetaminophen (TYLENOL) 500 MG tablet Take 2 tablets (1,000 mg) by mouth every 8 hours      aspirin 81 MG EC tablet Take 1 tablet (81 mg) by mouth 2 times daily 60 tablet 0    atenolol (TENORMIN) 25 MG tablet Take 25 mg by mouth every morning      benzocaine (ORAJEL MAXIMUM STRENGTH) 20 % GEL gel Take by mouth 4 times daily as needed for mouth sores      calcium polycarbophil (FIBERCON) 625 MG tablet Take 2 tablets by mouth At Bedtime      cholecalciferol (VITAMIN D3) 25 mcg (1000 units) capsule Take 1 capsule by mouth every  "morning      DULoxetine (CYMBALTA) 60 MG capsule Take 120 mg by mouth every morning      fluticasone (FLONASE) 50 MCG/ACT nasal spray Spray 2 sprays into both nostrils every morning      lidocaine-prilocaine (EMLA) 2.5-2.5 % external cream Apply topically as needed for moderate pain      loperamide (IMODIUM) 2 MG capsule Take 2 mg by mouth 2 times daily as needed for diarrhea      melatonin 3 MG tablet Take 6 mg by mouth At Bedtime      metroNIDAZOLE (METROCREAM) 0.75 % cream Apply topically At Bedtime      mirabegron (MYRBETRIQ) 50 MG 24 hr tablet Take 50 mg by mouth every morning      multivitamin, therapeutic with minerals (THERA-VIT-M) TABS tablet Take 1 tablet by mouth every morning      Omega-3 Fatty Acids (FISH OIL OMEGA-3) 1000 MG CAPS Take 2 capsules by mouth At Bedtime      omeprazole 20 MG tablet Take 20 mg by mouth every morning (before breakfast)      oxyCODONE (ROXICODONE) 5 MG tablet Take 1 tablet (5 mg) by mouth every 4 hours as needed for moderate to severe pain 30 tablet 0    risperiDONE (RISPERDAL) 4 MG tablet Take 4 mg by mouth daily At 6 PM      senna-docusate (SENOKOT-S;PERICOLACE) 8.6-50 MG per tablet Take 2 tablets by mouth 2 times daily      zinc gluconate 50 MG tablet Take 50 mg by mouth daily (with breakfast)       Medications reviewed:  Medications reconciled to facility chart and changes were made to reflect current medications as identified as above med list. Below are the changes that were made:   Medications stopped since last EPIC medication reconciliation:   There are no discontinued medications.    Medications started since last [facility] medication reconciliation:  No orders of the defined types were placed in this encounter.        REVIEW OF SYSTEMS:  4 point ROS neg other than the symptoms noted above in the HPI.      PHYSICAL EXAM:  /69   Pulse 82   Temp 98.5  F (36.9  C)   Resp 18   Ht 1.702 m (5' 7\")   Wt 99.8 kg (220 lb)   SpO2 92%   BMI 34.46 kg/m    Physical " Exam  Cardiovascular:      Rate and Rhythm: Normal rate and regular rhythm.      Heart sounds: Normal heart sounds.   Pulmonary:      Effort: Pulmonary effort is normal.      Breath sounds: Normal breath sounds.   Abdominal:      General: Bowel sounds are normal.      Palpations: Abdomen is soft.   Musculoskeletal:      Left lower leg: Edema present.   Skin:     Comments: Dressing to left knee CDI   Neurological:      Mental Status: She is alert.   Psychiatric:         Mood and Affect: Mood normal.      Comments: Impaired memory,          ASSESSMENT / PLAN:  Aftercare following left knee joint replacement surgery  Primary osteoarthritis of left knee  Physical deconditioning  Surgery without complication. Pain controlled. Making progress with therapy. Some edema to left leg, but mild.   - analgesia with APAP (change from PRN to 1000mg TID), oxycodone 5mg q4h PRN  - ASA 81mg BID x 30 days for DVT ppx   - PT/OT  - WBAT  - follow-up with ortho in 1 weeks    Congenital hydrocephalus (H)  Developmental delay  SLUMS 14/30. Lives in group home.   - supportive cares     Drug induced constipation  + BM  Continue senna-s BID, Fibercon at bedtime      Primary hypertension  -150  - continue atenolol  25mg daily   - monitor and adjust    Functional incontinence  Suspect due to limited mobility, should improve when mobility improves  - PT/OT  - monitor   - try to anticipate needs, could consider scheduled toileting.       Orders:  NNO    Electronically signed by:  ALESHA Conti CNP

## 2023-08-08 ENCOUNTER — TRANSITIONAL CARE UNIT VISIT (OUTPATIENT)
Dept: GERIATRICS | Facility: CLINIC | Age: 68
End: 2023-08-08
Payer: COMMERCIAL

## 2023-08-08 VITALS
TEMPERATURE: 98.5 F | HEART RATE: 82 BPM | HEIGHT: 67 IN | SYSTOLIC BLOOD PRESSURE: 125 MMHG | RESPIRATION RATE: 18 BRPM | WEIGHT: 220 LBS | BODY MASS INDEX: 34.53 KG/M2 | OXYGEN SATURATION: 92 % | DIASTOLIC BLOOD PRESSURE: 69 MMHG

## 2023-08-08 DIAGNOSIS — R62.50 DEVELOPMENTAL DELAY: ICD-10-CM

## 2023-08-08 DIAGNOSIS — Z47.1 AFTERCARE FOLLOWING LEFT KNEE JOINT REPLACEMENT SURGERY: Primary | ICD-10-CM

## 2023-08-08 DIAGNOSIS — K59.03 DRUG INDUCED CONSTIPATION: ICD-10-CM

## 2023-08-08 DIAGNOSIS — R39.81 FUNCTIONAL INCONTINENCE: ICD-10-CM

## 2023-08-08 DIAGNOSIS — M17.12 PRIMARY OSTEOARTHRITIS OF LEFT KNEE: ICD-10-CM

## 2023-08-08 DIAGNOSIS — I10 PRIMARY HYPERTENSION: ICD-10-CM

## 2023-08-08 DIAGNOSIS — Z96.652 AFTERCARE FOLLOWING LEFT KNEE JOINT REPLACEMENT SURGERY: Primary | ICD-10-CM

## 2023-08-08 DIAGNOSIS — Q03.9 CONGENITAL HYDROCEPHALUS (H): ICD-10-CM

## 2023-08-08 DIAGNOSIS — R53.81 PHYSICAL DECONDITIONING: ICD-10-CM

## 2023-08-08 PROCEDURE — 99309 SBSQ NF CARE MODERATE MDM 30: CPT | Performed by: NURSE PRACTITIONER

## 2023-08-08 NOTE — LETTER
8/8/2023        RE: Deepthi Gomez  [address]  [facility] [address]        [facility] GERIATRICS  ACUTE/EPISODIC VISIT    [facility] Medical Record Number: [id number]  Place of Service where encounter took place: [facility] ([facility]) [[id number]]    Chief Complaint   Patient presents with     RECHECK     HPI:    Deepthi Gomez is a 68 year old (1955), who is being seen today for an episodic care visit. HPI information obtained from: facility chart records, facility staff, patient report, and [facility] chart review.    Today's concern is: Recently hospitalized with elective left TKA. She is seen resting in bed today, she is using CPM. She reports knee gets sore, but pain has been controlled. She does have some swelling to her left knee, has been icing it. Staff report she has had some incontinence, they think because she is low going to the bathroom as sister reports she has some urgency at baseline. Appetite has been good. She told staff she does not want to return to her group home as she likes it better at the facility.     Prompt toileting TID  Leg swollen  Does not want to go back to group home    ALLERGIES:   Allergies   Allergen Reactions     Penicillins Rash     Sulfa Antibiotics Unknown     Patient does not remember reaction      MEDICATIONS:  Post Discharge Medication Reconciliation Status: medication reconcilation previously completed during another office visit.     Current Outpatient Medications   Medication Sig Dispense Refill     acetaminophen (TYLENOL) 500 MG tablet Take 2 tablets (1,000 mg) by mouth every 8 hours       aspirin 81 MG EC tablet Take 1 tablet (81 mg) by mouth 2 times daily 60 tablet 0     atenolol (TENORMIN) 25 MG tablet Take 25 mg by mouth every morning       benzocaine (ORAJEL MAXIMUM STRENGTH) 20 % GEL gel Take by mouth 4 times daily as needed for mouth sores       calcium polycarbophil (FIBERCON) 625 MG tablet Take 2 tablets by mouth At  Bedtime       cholecalciferol (VITAMIN D3) 25 mcg (1000 units) capsule Take 1 capsule by mouth every morning       DULoxetine (CYMBALTA) 60 MG capsule Take 120 mg by mouth every morning       fluticasone (FLONASE) 50 MCG/ACT nasal spray Spray 2 sprays into both nostrils every morning       lidocaine-prilocaine (EMLA) 2.5-2.5 % external cream Apply topically as needed for moderate pain       loperamide (IMODIUM) 2 MG capsule Take 2 mg by mouth 2 times daily as needed for diarrhea       melatonin 3 MG tablet Take 6 mg by mouth At Bedtime       metroNIDAZOLE (METROCREAM) 0.75 % cream Apply topically At Bedtime       mirabegron (MYRBETRIQ) 50 MG 24 hr tablet Take 50 mg by mouth every morning       multivitamin, therapeutic with minerals (THERA-VIT-M) TABS tablet Take 1 tablet by mouth every morning       Omega-3 Fatty Acids (FISH OIL OMEGA-3) 1000 MG CAPS Take 2 capsules by mouth At Bedtime       omeprazole 20 MG tablet Take 20 mg by mouth every morning (before breakfast)       oxyCODONE (ROXICODONE) 5 MG tablet Take 1 tablet (5 mg) by mouth every 4 hours as needed for moderate to severe pain 30 tablet 0     risperiDONE (RISPERDAL) 4 MG tablet Take 4 mg by mouth daily At 6 PM       senna-docusate (SENOKOT-S;PERICOLACE) 8.6-50 MG per tablet Take 2 tablets by mouth 2 times daily       zinc gluconate 50 MG tablet Take 50 mg by mouth daily (with breakfast)       Medications reviewed:  Medications reconciled to facility chart and changes were made to reflect current medications as identified as above med list. Below are the changes that were made:   Medications stopped since last EPIC medication reconciliation:   There are no discontinued medications.    Medications started since last [facility] medication reconciliation:  No orders of the defined types were placed in this encounter.        REVIEW OF SYSTEMS:  4 point ROS neg other than the symptoms noted above in the HPI.      PHYSICAL EXAM:  /69   Pulse 82   Temp 98.5  F  "(36.9  C)   Resp 18   Ht 1.702 m (5' 7\")   Wt 99.8 kg (220 lb)   SpO2 92%   BMI 34.46 kg/m    Physical Exam  Cardiovascular:      Rate and Rhythm: Normal rate and regular rhythm.      Heart sounds: Normal heart sounds.   Pulmonary:      Effort: Pulmonary effort is normal.      Breath sounds: Normal breath sounds.   Abdominal:      General: Bowel sounds are normal.      Palpations: Abdomen is soft.   Musculoskeletal:      Left lower leg: Edema present.   Skin:     Comments: Dressing to left knee CDI   Neurological:      Mental Status: She is alert.   Psychiatric:         Mood and Affect: Mood normal.      Comments: Impaired memory,          ASSESSMENT / PLAN:  Aftercare following left knee joint replacement surgery  Primary osteoarthritis of left knee  Physical deconditioning  Surgery without complication. Pain controlled. Making progress with therapy. Some edema to left leg, but mild.   - analgesia with APAP (change from PRN to 1000mg TID), oxycodone 5mg q4h PRN  - ASA 81mg BID x 30 days for DVT ppx   - PT/OT  - WBAT  - follow-up with ortho in 1 weeks    Congenital hydrocephalus (H)  Developmental delay  SLUMS 14/30. Lives in group home.   - supportive cares     Drug induced constipation  + BM  Continue senna-s BID, Fibercon at bedtime      Primary hypertension  -150  - continue atenolol  25mg daily   - monitor and adjust    Functional incontinence  Suspect due to limited mobility, should improve when mobility improves  - PT/OT  - monitor   - try to anticipate needs, could consider scheduled toileting.       Orders:  NNO    Electronically signed by:  ALESHA Conti CNP      Sincerely,        ALESHA Conti CNP      "

## 2023-08-09 ENCOUNTER — LAB REQUISITION (OUTPATIENT)
Dept: LAB | Facility: CLINIC | Age: 68
End: 2023-08-09

## 2023-08-09 ENCOUNTER — TELEPHONE (OUTPATIENT)
Dept: GERIATRICS | Facility: CLINIC | Age: 68
End: 2023-08-09
Payer: COMMERCIAL

## 2023-08-09 DIAGNOSIS — R30.0 DYSURIA: ICD-10-CM

## 2023-08-09 DIAGNOSIS — R50.9 FEVER, UNSPECIFIED: ICD-10-CM

## 2023-08-09 DIAGNOSIS — R41.82 ALTERED MENTAL STATUS, UNSPECIFIED: ICD-10-CM

## 2023-08-09 DIAGNOSIS — R31.9 HEMATURIA, UNSPECIFIED: ICD-10-CM

## 2023-08-09 LAB
ALBUMIN UR-MCNC: 100 MG/DL
APPEARANCE UR: ABNORMAL
BILIRUB UR QL STRIP: NEGATIVE
COLOR UR AUTO: YELLOW
GLUCOSE UR STRIP-MCNC: NEGATIVE MG/DL
HGB UR QL STRIP: ABNORMAL
KETONES UR STRIP-MCNC: NEGATIVE MG/DL
LEUKOCYTE ESTERASE UR QL STRIP: ABNORMAL
NITRATE UR QL: NEGATIVE
PH UR STRIP: 6 [PH] (ref 5–7)
RBC URINE: 15 /HPF
SP GR UR STRIP: 1.01 (ref 1–1.03)
UROBILINOGEN UR STRIP-MCNC: NORMAL MG/DL
WBC URINE: >182 /HPF

## 2023-08-09 PROCEDURE — 87086 URINE CULTURE/COLONY COUNT: CPT | Performed by: NURSE PRACTITIONER

## 2023-08-09 PROCEDURE — 81001 URINALYSIS AUTO W/SCOPE: CPT | Performed by: NURSE PRACTITIONER

## 2023-08-09 NOTE — TELEPHONE ENCOUNTER
[facility] Triage Nurse Telephone Encounter    Provider: ALESHA Sanon CNP  Facility: [facility] Facility Type:  TCU    Caller: Victorina  Call Back Number: [phone]    Allergies:    Allergies   Allergen Reactions     Penicillins Rash     Sulfa Antibiotics Unknown     Patient does not remember reaction        Reason for call: Nurse is calling to report that patient has cloudy urine and is c/o pain with voiding.  No fever noted.      Verbal Order/Direction given by Provider: Obtain a straight cath UA/UC.      Provider giving Order:  ALESHA Sanon CNP    Verbal Order given to: Victorina Hammond RN

## 2023-08-09 NOTE — TELEPHONE ENCOUNTER
[facility]iatric[facility] Lab Note     Provider: ALESHA Sanon CNP  Facility: [facility] Facility Type:  TCU    Allergies   Allergen Reactions    Penicillins Rash    Sulfa Antibiotics Unknown     Patient does not remember reaction       Labs Reviewed by provider: Urine Culture     Verbal Order/Direction given by Provider:   Macrobid 100mg PO BID x 5 days for UTI    Provider giving Order:  ALESHA Sanon CNP    Verbal Order given to: Laurie eSgal RN

## 2023-08-10 NOTE — PROGRESS NOTES
[facility] GERIATRICS  ACUTE/EPISODIC VISIT    [facility] Medical Record Number: [id number]  Place of Service where encounter took place: [facility] ([facility]) [[id number]]    Chief Complaint   Patient presents with    RECHECK     HPI:    Deepthi Gomez is a 68 year old (1955), who is being seen today for an episodic care visit. HPI information obtained from: facility chart records, facility staff, patient report, and [facility] chart review.    Today's concern is: Recently hospitalized with left TKA. She developed burning with urination 2 days ago. Has been having incontinence as unable to get to bathroom in time. Macrobid was ordered on 8/9, but due to staff failing to put name on order sent to pharmacy she did not get dose until 8/10. She reports continued burning with urination, but things it is a little better. She remains afebrile. Denies any low back pain.              ALLERGIES:   Allergies   Allergen Reactions    Penicillins Rash    Sulfa Antibiotics Unknown     Patient does not remember reaction      MEDICATIONS:  Post Discharge Medication Reconciliation Status: medication reconcilation previously completed during another office visit.     Current Outpatient Medications   Medication Sig Dispense Refill    nitroFURantoin macrocrystal-monohydrate (MACROBID) 100 MG capsule Take 1 capsule (100 mg) by mouth 2 times daily for 5 days 10 capsule 0    acetaminophen (TYLENOL) 500 MG tablet Take 2 tablets (1,000 mg) by mouth every 8 hours      aspirin 81 MG EC tablet Take 1 tablet (81 mg) by mouth 2 times daily 60 tablet 0    atenolol (TENORMIN) 25 MG tablet Take 25 mg by mouth every morning      benzocaine (ORAJEL MAXIMUM STRENGTH) 20 % GEL gel Take by mouth 4 times daily as needed for mouth sores      calcium polycarbophil (FIBERCON) 625 MG tablet Take 2 tablets by mouth At Bedtime      cholecalciferol (VITAMIN D3) 25 mcg (1000 units) capsule Take 1 capsule by mouth every morning       "DULoxetine (CYMBALTA) 60 MG capsule Take 120 mg by mouth every morning      fluticasone (FLONASE) 50 MCG/ACT nasal spray Spray 2 sprays into both nostrils every morning      lidocaine-prilocaine (EMLA) 2.5-2.5 % external cream Apply topically as needed for moderate pain      loperamide (IMODIUM) 2 MG capsule Take 2 mg by mouth 2 times daily as needed for diarrhea      melatonin 3 MG tablet Take 6 mg by mouth At Bedtime      metroNIDAZOLE (METROCREAM) 0.75 % cream Apply topically At Bedtime      mirabegron (MYRBETRIQ) 50 MG 24 hr tablet Take 50 mg by mouth every morning      multivitamin, therapeutic with minerals (THERA-VIT-M) TABS tablet Take 1 tablet by mouth every morning      Omega-3 Fatty Acids (FISH OIL OMEGA-3) 1000 MG CAPS Take 2 capsules by mouth At Bedtime      omeprazole 20 MG tablet Take 20 mg by mouth every morning (before breakfast)      oxyCODONE (ROXICODONE) 5 MG tablet Take 1 tablet (5 mg) by mouth every 4 hours as needed for moderate to severe pain 30 tablet 0    risperiDONE (RISPERDAL) 4 MG tablet Take 4 mg by mouth daily At 6 PM      senna-docusate (SENOKOT-S;PERICOLACE) 8.6-50 MG per tablet Take 2 tablets by mouth 2 times daily      zinc gluconate 50 MG tablet Take 50 mg by mouth daily (with breakfast)       Medications reviewed:  Medications reconciled to facility chart and changes were made to reflect current medications as identified as above med list. Below are the changes that were made:   Medications stopped since last EPIC medication reconciliation:   There are no discontinued medications.    Medications started since last [facility] medication reconciliation:  No orders of the defined types were placed in this encounter.        REVIEW OF SYSTEMS:  4 point ROS neg other than the symptoms noted above in the HPI.      PHYSICAL EXAM:  BP (!) 140/62   Pulse 85   Temp 97.8  F (36.6  C)   Resp 19   Ht 1.676 m (5' 6\")   Wt 99.9 kg (220 lb 3.2 oz)   SpO2 96%   BMI 35.54 kg/m    Physical " Exam  Cardiovascular:      Rate and Rhythm: Normal rate and regular rhythm.      Heart sounds: Normal heart sounds.   Pulmonary:      Effort: Pulmonary effort is normal.      Breath sounds: Normal breath sounds.   Abdominal:      General: Bowel sounds are normal.   Musculoskeletal:         General: Swelling present.      Comments: Decreased ROM to left knee   Neurological:      Mental Status: She is alert.   Psychiatric:         Mood and Affect: Mood normal.         ASSESSMENT / PLAN:  Acute cystitis without hematuria  UC grew 10-50K pseudomonas aeruginosa, sensitivities pending; has PCN and sulfa allergy. Continues to have dysuria.   - nitroFURantoin macrocrystal-monohydrate (MACROBID) 100 MG capsule; Take 1 capsule (100 mg) by mouth 2 times daily for 5 days  - ensure good opal cares     Primary hypertension  -150, suspect pain contributing at times  - continue atenolol 25mg daily.     Drug induced constipation  Staff report + BM   - Continue senna-s BID, Fibercon at bedtime   - monitor and adjust   Orders:  NNO    Electronically signed by:  ALESHA Conti CNP

## 2023-08-11 ENCOUNTER — TRANSITIONAL CARE UNIT VISIT (OUTPATIENT)
Dept: GERIATRICS | Facility: CLINIC | Age: 68
End: 2023-08-11
Payer: COMMERCIAL

## 2023-08-11 VITALS
RESPIRATION RATE: 19 BRPM | HEART RATE: 85 BPM | DIASTOLIC BLOOD PRESSURE: 62 MMHG | WEIGHT: 220.2 LBS | BODY MASS INDEX: 35.39 KG/M2 | OXYGEN SATURATION: 96 % | SYSTOLIC BLOOD PRESSURE: 140 MMHG | TEMPERATURE: 97.8 F | HEIGHT: 66 IN

## 2023-08-11 DIAGNOSIS — N30.00 ACUTE CYSTITIS WITHOUT HEMATURIA: Primary | ICD-10-CM

## 2023-08-11 DIAGNOSIS — K59.03 DRUG INDUCED CONSTIPATION: ICD-10-CM

## 2023-08-11 DIAGNOSIS — I10 PRIMARY HYPERTENSION: ICD-10-CM

## 2023-08-11 PROCEDURE — 99309 SBSQ NF CARE MODERATE MDM 30: CPT | Performed by: NURSE PRACTITIONER

## 2023-08-11 RX ORDER — NITROFURANTOIN 25; 75 MG/1; MG/1
100 CAPSULE ORAL 2 TIMES DAILY
Qty: 10 CAPSULE | Refills: 0
Start: 2023-08-10 | End: 2023-08-15

## 2023-08-11 NOTE — LETTER
8/11/2023        RE: Deepthi Gomez  [address]  [facility] [address]        [facility] GERIATRICS  ACUTE/EPISODIC VISIT    [facility] Medical Record Number: [id number]  Place of Service where encounter took place: [facility] ([facility]) [[id number]]    Chief Complaint   Patient presents with     RECHECK     HPI:    Deepthi Gomez is a 68 year old (1955), who is being seen today for an episodic care visit. HPI information obtained from: facility chart records, facility staff, patient report, and [facility] chart review.    Today's concern is: Recently hospitalized with left TKA. She developed burning with urination 2 days ago. Has been having incontinence as unable to get to bathroom in time. Macrobid was ordered on 8/9, but due to staff failing to put name on order sent to pharmacy she did not get dose until 8/10. She reports continued burning with urination, but things it is a little better. She remains afebrile. Denies any low back pain.              ALLERGIES:   Allergies   Allergen Reactions     Penicillins Rash     Sulfa Antibiotics Unknown     Patient does not remember reaction      MEDICATIONS:  Post Discharge Medication Reconciliation Status: medication reconcilation previously completed during another office visit.     Current Outpatient Medications   Medication Sig Dispense Refill     nitroFURantoin macrocrystal-monohydrate (MACROBID) 100 MG capsule Take 1 capsule (100 mg) by mouth 2 times daily for 5 days 10 capsule 0     acetaminophen (TYLENOL) 500 MG tablet Take 2 tablets (1,000 mg) by mouth every 8 hours       aspirin 81 MG EC tablet Take 1 tablet (81 mg) by mouth 2 times daily 60 tablet 0     atenolol (TENORMIN) 25 MG tablet Take 25 mg by mouth every morning       benzocaine (ORAJEL MAXIMUM STRENGTH) 20 % GEL gel Take by mouth 4 times daily as needed for mouth sores       calcium polycarbophil (FIBERCON) 625 MG tablet Take 2 tablets by mouth At Bedtime        cholecalciferol (VITAMIN D3) 25 mcg (1000 units) capsule Take 1 capsule by mouth every morning       DULoxetine (CYMBALTA) 60 MG capsule Take 120 mg by mouth every morning       fluticasone (FLONASE) 50 MCG/ACT nasal spray Spray 2 sprays into both nostrils every morning       lidocaine-prilocaine (EMLA) 2.5-2.5 % external cream Apply topically as needed for moderate pain       loperamide (IMODIUM) 2 MG capsule Take 2 mg by mouth 2 times daily as needed for diarrhea       melatonin 3 MG tablet Take 6 mg by mouth At Bedtime       metroNIDAZOLE (METROCREAM) 0.75 % cream Apply topically At Bedtime       mirabegron (MYRBETRIQ) 50 MG 24 hr tablet Take 50 mg by mouth every morning       multivitamin, therapeutic with minerals (THERA-VIT-M) TABS tablet Take 1 tablet by mouth every morning       Omega-3 Fatty Acids (FISH OIL OMEGA-3) 1000 MG CAPS Take 2 capsules by mouth At Bedtime       omeprazole 20 MG tablet Take 20 mg by mouth every morning (before breakfast)       oxyCODONE (ROXICODONE) 5 MG tablet Take 1 tablet (5 mg) by mouth every 4 hours as needed for moderate to severe pain 30 tablet 0     risperiDONE (RISPERDAL) 4 MG tablet Take 4 mg by mouth daily At 6 PM       senna-docusate (SENOKOT-S;PERICOLACE) 8.6-50 MG per tablet Take 2 tablets by mouth 2 times daily       zinc gluconate 50 MG tablet Take 50 mg by mouth daily (with breakfast)       Medications reviewed:  Medications reconciled to facility chart and changes were made to reflect current medications as identified as above med list. Below are the changes that were made:   Medications stopped since last EPIC medication reconciliation:   There are no discontinued medications.    Medications started since last [facility] medication reconciliation:  No orders of the defined types were placed in this encounter.        REVIEW OF SYSTEMS:  4 point ROS neg other than the symptoms noted above in the HPI.      PHYSICAL EXAM:  BP (!) 140/62   Pulse 85   Temp 97.8  F (36.6  C)  "  Resp 19   Ht 1.676 m (5' 6\")   Wt 99.9 kg (220 lb 3.2 oz)   SpO2 96%   BMI 35.54 kg/m    Physical Exam  Cardiovascular:      Rate and Rhythm: Normal rate and regular rhythm.      Heart sounds: Normal heart sounds.   Pulmonary:      Effort: Pulmonary effort is normal.      Breath sounds: Normal breath sounds.   Abdominal:      General: Bowel sounds are normal.   Musculoskeletal:         General: Swelling present.      Comments: Decreased ROM to left knee   Neurological:      Mental Status: She is alert.   Psychiatric:         Mood and Affect: Mood normal.         ASSESSMENT / PLAN:  Acute cystitis without hematuria  UC grew 10-50K pseudomonas aeruginosa, sensitivities pending; has PCN and sulfa allergy. Continues to have dysuria.   - nitroFURantoin macrocrystal-monohydrate (MACROBID) 100 MG capsule; Take 1 capsule (100 mg) by mouth 2 times daily for 5 days  - ensure good opal cares     Primary hypertension  -150, suspect pain contributing at times  - continue atenolol 25mg daily.     Drug induced constipation  Staff report + BM   - Continue senna-s BID, Fibercon at bedtime   - monitor and adjust   Orders:  NNO    Electronically signed by:  ALESHA Conti CNP      Sincerely,        ALESHA Conti CNP      "

## 2023-08-12 LAB — BACTERIA UR CULT: ABNORMAL

## 2023-08-14 NOTE — PROGRESS NOTES
[facility] GERIATRICS  ACUTE/EPISODIC VISIT    [facility] Medical Record Number: [id number]  Place of Service where encounter took place: [facility] ([facility]) [[id number]]    Chief Complaint   Patient presents with    RECHECK     HPI:    Deepthi Gomez is a 68 year old (1955), who is being seen today for an episodic care visit. HPI information obtained from: facility chart records, facility staff, patient report, and [facility] chart review.    Today's concern is:  Recently hospitalized with left TKA. Had reports of dysuria. UC grew 10-50K pseudomonas aeruginosa. . Changed to Cipro and macrobid was stopped. She has been complaining of vaginal itching. Nursing staff report minimal erythema, and note no discharge. She has attempted to use this as an excused not to do therapy. Does need a lot of encouragement to participate in therapy but is making progress. Was able to ambulate to therapy gym today. Standing tolerance is improving. Has been compliant with her CPM machine. She reports she is having pain in her knee, but the pain medication makes it better. She reports normal bowel movements. Feels the swelling in her knee has improved.                 ALLERGIES:   Allergies   Allergen Reactions    Penicillins Rash    Sulfa Antibiotics Unknown     Patient does not remember reaction      MEDICATIONS:  Post Discharge Medication Reconciliation Status: medication reconcilation previously completed during another office visit.     Current Outpatient Medications   Medication Sig Dispense Refill    miconazole (MICATIN) 200 MG vaginal suppository Place 1 suppository (200 mg) vaginally At Bedtime for 3 days 3 suppository 0    acetaminophen (TYLENOL) 500 MG tablet Take 2 tablets (1,000 mg) by mouth every 8 hours      aspirin 81 MG EC tablet Take 1 tablet (81 mg) by mouth 2 times daily 60 tablet 0    atenolol (TENORMIN) 25 MG tablet Take 25 mg by mouth every morning      benzocaine (ORAJEL MAXIMUM  STRENGTH) 20 % GEL gel Take by mouth 4 times daily as needed for mouth sores      calcium polycarbophil (FIBERCON) 625 MG tablet Take 2 tablets by mouth At Bedtime      cholecalciferol (VITAMIN D3) 25 mcg (1000 units) capsule Take 1 capsule by mouth every morning      DULoxetine (CYMBALTA) 60 MG capsule Take 120 mg by mouth every morning      fluticasone (FLONASE) 50 MCG/ACT nasal spray Spray 2 sprays into both nostrils every morning      lidocaine-prilocaine (EMLA) 2.5-2.5 % external cream Apply topically as needed for moderate pain      loperamide (IMODIUM) 2 MG capsule Take 2 mg by mouth 2 times daily as needed for diarrhea      melatonin 3 MG tablet Take 6 mg by mouth At Bedtime      metroNIDAZOLE (METROCREAM) 0.75 % cream Apply topically At Bedtime      mirabegron (MYRBETRIQ) 50 MG 24 hr tablet Take 50 mg by mouth every morning      multivitamin, therapeutic with minerals (THERA-VIT-M) TABS tablet Take 1 tablet by mouth every morning      nitroFURantoin macrocrystal-monohydrate (MACROBID) 100 MG capsule Take 1 capsule (100 mg) by mouth 2 times daily for 5 days 10 capsule 0    Omega-3 Fatty Acids (FISH OIL OMEGA-3) 1000 MG CAPS Take 2 capsules by mouth At Bedtime      omeprazole 20 MG tablet Take 20 mg by mouth every morning (before breakfast)      oxyCODONE (ROXICODONE) 5 MG tablet Take 1 tablet (5 mg) by mouth every 4 hours as needed for moderate to severe pain 30 tablet 0    risperiDONE (RISPERDAL) 4 MG tablet Take 4 mg by mouth daily At 6 PM      senna-docusate (SENOKOT-S;PERICOLACE) 8.6-50 MG per tablet Take 2 tablets by mouth 2 times daily      zinc gluconate 50 MG tablet Take 50 mg by mouth daily (with breakfast)       Medications reviewed:  Medications reconciled to facility chart and changes were made to reflect current medications as identified as above med list. Below are the changes that were made:   Medications stopped since last EPIC medication reconciliation:   There are no discontinued  "medications.    Medications started since last [facility] medication reconciliation:  No orders of the defined types were placed in this encounter.        REVIEW OF SYSTEMS:  4 point ROS neg other than the symptoms noted above in the HPI.      PHYSICAL EXAM:  /80   Pulse 68   Temp 97.4  F (36.3  C)   Resp 18   Ht 1.676 m (5' 6\")   Wt 98.5 kg (217 lb 3.2 oz)   SpO2 96%   BMI 35.06 kg/m    Physical Exam  Cardiovascular:      Rate and Rhythm: Normal rate and regular rhythm.      Heart sounds: Normal heart sounds.   Pulmonary:      Effort: Pulmonary effort is normal.      Breath sounds: Normal breath sounds.   Abdominal:      General: Bowel sounds are normal.   Musculoskeletal:         General: Swelling present.      Comments: Decreased ROM to left knee   Neurological:      Mental Status: She is alert.   Psychiatric:         Mood and Affect: Mood normal.      Comments: Forgetful, impaired judgement         ASSESSMENT / PLAN:  Acute cystitis without hematuria  Uc gew 10-50K pseudomonas aeruginosa. Feels burning is getting better  - complete course of Cipro    Aftercare following left knee joint replacement surgery  Primary osteoarthritis of left knee  Physical deconditioning  Surgery without complication. Pain controlled. Making progress with therapy,need encouragement. Edema in left leg is improving.    - analgesia with APAP 1000mg TID, oxycodone 5mg q4h PRN  - ASA 81mg BID x 30 days for DVT ppx   - PT/OT  - WBAT  - follow-up with ortho as schedled    Primary hypertension  -140, HR 60-70  - continue atenolol  25mg daily   - monitor and adjust    Drug induced constipation  + BM  - continue senna-s BID, Fibercon at bedtime    Yeast infection of the vagina  NO discharge, but is reporting vaginal itching. Suspect somewhat behavioral, but given recent abx yeast is a concern.   - miconazole (MICATIN) 200 MG vaginal suppository; Place 1 suppository (200 mg) vaginally At Bedtime for 3 days  - selan AF cream BID " PRN  - monitor and adjust       Orders:  Monistat 3 day apply vaginally at bedtime  Selan - AF cream BID PRN vaginal/opal area for itching    Electronically signed by:  ALESHA Conti CNP

## 2023-08-15 ENCOUNTER — TRANSITIONAL CARE UNIT VISIT (OUTPATIENT)
Dept: GERIATRICS | Facility: CLINIC | Age: 68
End: 2023-08-15
Payer: COMMERCIAL

## 2023-08-15 VITALS
OXYGEN SATURATION: 96 % | RESPIRATION RATE: 18 BRPM | TEMPERATURE: 97.4 F | DIASTOLIC BLOOD PRESSURE: 80 MMHG | HEART RATE: 68 BPM | BODY MASS INDEX: 34.91 KG/M2 | WEIGHT: 217.2 LBS | SYSTOLIC BLOOD PRESSURE: 132 MMHG | HEIGHT: 66 IN

## 2023-08-15 DIAGNOSIS — K59.03 DRUG INDUCED CONSTIPATION: ICD-10-CM

## 2023-08-15 DIAGNOSIS — R53.81 PHYSICAL DECONDITIONING: ICD-10-CM

## 2023-08-15 DIAGNOSIS — N30.00 ACUTE CYSTITIS WITHOUT HEMATURIA: Primary | ICD-10-CM

## 2023-08-15 DIAGNOSIS — B37.31 YEAST INFECTION OF THE VAGINA: ICD-10-CM

## 2023-08-15 DIAGNOSIS — Z47.1 AFTERCARE FOLLOWING LEFT KNEE JOINT REPLACEMENT SURGERY: ICD-10-CM

## 2023-08-15 DIAGNOSIS — M17.12 PRIMARY OSTEOARTHRITIS OF LEFT KNEE: ICD-10-CM

## 2023-08-15 DIAGNOSIS — I10 PRIMARY HYPERTENSION: ICD-10-CM

## 2023-08-15 DIAGNOSIS — Z96.652 AFTERCARE FOLLOWING LEFT KNEE JOINT REPLACEMENT SURGERY: ICD-10-CM

## 2023-08-15 PROCEDURE — 99309 SBSQ NF CARE MODERATE MDM 30: CPT | Performed by: NURSE PRACTITIONER

## 2023-08-15 RX ORDER — CIPROFLOXACIN 500 MG/1
500 TABLET, FILM COATED ORAL DAILY
Qty: 3 TABLET | Refills: 0
Start: 2023-08-14 | End: 2023-08-17

## 2023-08-15 RX ORDER — MICONAZOLE NITRATE 20 MG/G
CREAM TOPICAL 2 TIMES DAILY PRN
Start: 2023-08-15 | End: 2023-08-22

## 2023-08-15 NOTE — LETTER
8/15/2023        RE: Deepthi Gomez  [address]  [facility] [address]        [facility] GERIATRICS  ACUTE/EPISODIC VISIT    [facility] Medical Record Number: [id number]  Place of Service where encounter took place: [facility] ([facility]) [[id number]]    Chief Complaint   Patient presents with     RECHECK     HPI:    Deepthi Gomez is a 68 year old (1955), who is being seen today for an episodic care visit. HPI information obtained from: facility chart records, facility staff, patient report, and [facility] chart review.    Today's concern is:  Recently hospitalized with left TKA. Had reports of dysuria. UC grew 10-50K pseudomonas aeruginosa. . Changed to Cipro and macrobid was stopped. She has been complaining of vaginal itching. Nursing staff report minimal erythema, and note no discharge. She has attempted to use this as an excused not to do therapy. Does need a lot of encouragement to participate in therapy but is making progress. Was able to ambulate to therapy gym today. Standing tolerance is improving. Has been compliant with her CPM machine. She reports she is having pain in her knee, but the pain medication makes it better. She reports normal bowel movements. Feels the swelling in her knee has improved.                 ALLERGIES:   Allergies   Allergen Reactions     Penicillins Rash     Sulfa Antibiotics Unknown     Patient does not remember reaction      MEDICATIONS:  Post Discharge Medication Reconciliation Status: medication reconcilation previously completed during another office visit.     Current Outpatient Medications   Medication Sig Dispense Refill     miconazole (MICATIN) 200 MG vaginal suppository Place 1 suppository (200 mg) vaginally At Bedtime for 3 days 3 suppository 0     acetaminophen (TYLENOL) 500 MG tablet Take 2 tablets (1,000 mg) by mouth every 8 hours       aspirin 81 MG EC tablet Take 1 tablet (81 mg) by mouth 2 times daily 60 tablet 0      atenolol (TENORMIN) 25 MG tablet Take 25 mg by mouth every morning       benzocaine (ORAJEL MAXIMUM STRENGTH) 20 % GEL gel Take by mouth 4 times daily as needed for mouth sores       calcium polycarbophil (FIBERCON) 625 MG tablet Take 2 tablets by mouth At Bedtime       cholecalciferol (VITAMIN D3) 25 mcg (1000 units) capsule Take 1 capsule by mouth every morning       DULoxetine (CYMBALTA) 60 MG capsule Take 120 mg by mouth every morning       fluticasone (FLONASE) 50 MCG/ACT nasal spray Spray 2 sprays into both nostrils every morning       lidocaine-prilocaine (EMLA) 2.5-2.5 % external cream Apply topically as needed for moderate pain       loperamide (IMODIUM) 2 MG capsule Take 2 mg by mouth 2 times daily as needed for diarrhea       melatonin 3 MG tablet Take 6 mg by mouth At Bedtime       metroNIDAZOLE (METROCREAM) 0.75 % cream Apply topically At Bedtime       mirabegron (MYRBETRIQ) 50 MG 24 hr tablet Take 50 mg by mouth every morning       multivitamin, therapeutic with minerals (THERA-VIT-M) TABS tablet Take 1 tablet by mouth every morning       nitroFURantoin macrocrystal-monohydrate (MACROBID) 100 MG capsule Take 1 capsule (100 mg) by mouth 2 times daily for 5 days 10 capsule 0     Omega-3 Fatty Acids (FISH OIL OMEGA-3) 1000 MG CAPS Take 2 capsules by mouth At Bedtime       omeprazole 20 MG tablet Take 20 mg by mouth every morning (before breakfast)       oxyCODONE (ROXICODONE) 5 MG tablet Take 1 tablet (5 mg) by mouth every 4 hours as needed for moderate to severe pain 30 tablet 0     risperiDONE (RISPERDAL) 4 MG tablet Take 4 mg by mouth daily At 6 PM       senna-docusate (SENOKOT-S;PERICOLACE) 8.6-50 MG per tablet Take 2 tablets by mouth 2 times daily       zinc gluconate 50 MG tablet Take 50 mg by mouth daily (with breakfast)       Medications reviewed:  Medications reconciled to facility chart and changes were made to reflect current medications as identified as above med list. Below are the changes  "that were made:   Medications stopped since last EPIC medication reconciliation:   There are no discontinued medications.    Medications started since last [facility] medication reconciliation:  No orders of the defined types were placed in this encounter.        REVIEW OF SYSTEMS:  4 point ROS neg other than the symptoms noted above in the HPI.      PHYSICAL EXAM:  /80   Pulse 68   Temp 97.4  F (36.3  C)   Resp 18   Ht 1.676 m (5' 6\")   Wt 98.5 kg (217 lb 3.2 oz)   SpO2 96%   BMI 35.06 kg/m    Physical Exam  Cardiovascular:      Rate and Rhythm: Normal rate and regular rhythm.      Heart sounds: Normal heart sounds.   Pulmonary:      Effort: Pulmonary effort is normal.      Breath sounds: Normal breath sounds.   Abdominal:      General: Bowel sounds are normal.   Musculoskeletal:         General: Swelling present.      Comments: Decreased ROM to left knee   Neurological:      Mental Status: She is alert.   Psychiatric:         Mood and Affect: Mood normal.      Comments: Forgetful, impaired judgement         ASSESSMENT / PLAN:  Acute cystitis without hematuria  Uc gew 10-50K pseudomonas aeruginosa. Feels burning is getting better  - complete course of Cipro    Aftercare following left knee joint replacement surgery  Primary osteoarthritis of left knee  Physical deconditioning  Surgery without complication. Pain controlled. Making progress with therapy,need encouragement. Edema in left leg is improving.    - analgesia with APAP 1000mg TID, oxycodone 5mg q4h PRN  - ASA 81mg BID x 30 days for DVT ppx   - PT/OT  - WBAT  - follow-up with ortho as schedled    Primary hypertension  -140, HR 60-70  - continue atenolol  25mg daily   - monitor and adjust    Drug induced constipation  + BM  - continue senna-s BID, Fibercon at bedtime    Yeast infection of the vagina  NO discharge, but is reporting vaginal itching. Suspect somewhat behavioral, but given recent abx yeast is a concern.   - miconazole (MICATIN) 200 " MG vaginal suppository; Place 1 suppository (200 mg) vaginally At Bedtime for 3 days  - selan AF cream BID PRN  - monitor and adjust       Orders:  Monistat 3 day apply vaginally at bedtime  Selan - AF cream BID PRN vaginal/opal area for itching    Electronically signed by:  ALESHA Conti CNP      Sincerely,        ALESHA Conti CNP

## 2023-08-21 NOTE — PROGRESS NOTES
[facility] GERIATRICS DISCHARGE SUMMARY  Patient Name: Depethi Gomez  YOB: 1955  [place] Medical Record Number: [id number]  Place of Service Where Encounter Took Place: [facility] ([facility]) [[id number]]    PRIMARY CARE PROVIDER AND CLINIC RESPONSIBLE AFTER TRANSFER: RED SHAH, [address] [facility] / [place] [address]; Non-[facility] Provider     Transferring providers: ALESHA Sanon CNP; Ev Waite MD  Recent Hospitalization/ED: [facility] stay 7/26/23 to 7/28/23.  Date of SNF Admission: July 28, 2023  Date of SNF (anticipated) Discharge: August 28, 2023  Discharged to: Previous Group Home  Cognitive Scores: SLUMS: 14/30  Physical Function: Ambulating 300+ ft with 2WW, SBA, is self limiting  DME: has Walker, No new DME needed    CODE STATUS/ADVANCE DIRECTIVES DISCUSSION: Full Code  ALLERGIES: Penicillins and Sulfa antibiotics    NURSING FACILITY COURSE:  Medication Changes/Rationale:   Completed 30 days of ASA on 8/26    Summary of nursing facility stay:   Aftercare following left knee joint replacement surgery  Primary osteoarthritis of left knee  Physical deconditioning  Surgery without complication. Had ortho follow, no longer needs CPM machine. Pain controlled. Made progress with therapy, was self limited at times. Edema in leg significantly improved  - continue analgesia with APAP 1000mg TID, oxycodone 5mg q4h PRN  - ASA 81mg BID x 30 days for DVT ppx  (done 8/26)  -  outpatient PT/OT at discharge  - WBAT  - follow-up with ortho as scheduled    Acute cystitis without hematuria  UC grew 10-50K pseudomonas aeruginosa. Completed course of Cipro, symptoms resolved    Primary hypertension  BP controlled 130-140, HR 60-70  - continue atenolol 25mg daily     Drug induced constipation  In the setting of narcotics, limited mobility. + BM  - continue senna-s BID     Yeast infection of the vagina  Likely due to Abx, Completed course of Monistat.    - miconazole (MICATIN) 2 % external cream; Apply topically 2 times daily    Congenital hydrocephalus (H)  Developmental delay  SLUMS above, is discharging back to group home with support from sister    Functional incontinence  In the setting of limited mobility. Improved with advanced to be independent in room     LUC (generalized anxiety disorder)  Borderline personality disorder (H)  Follows with psychiatry last visit 5/11/23, does have history of psychosis. Does see a therapist.   - continue risperidone 4mg at bedtime, duloxetine 120mg daily, melatonin.   - supportive cares     CASSIE (obstructive sleep apnea)  - CPAP at home settings    Anemia, unspecified type  Hgb 13.2 ->9.5->10.8. No s/s of bleeding in TCU     Discharge Medications:  MED REC REQUIRED  Post Medication Reconciliation Status:  Medication reconciliation previously completed during another office visit    Current Outpatient Medications   Medication Sig Dispense Refill    miconazole (MICATIN) 2 % external cream Apply topically 2 times daily      acetaminophen (TYLENOL) 500 MG tablet Take 2 tablets (1,000 mg) by mouth every 8 hours      aspirin 81 MG EC tablet Take 1 tablet (81 mg) by mouth 2 times daily 60 tablet 0    atenolol (TENORMIN) 25 MG tablet Take 25 mg by mouth every morning      benzocaine (ORAJEL MAXIMUM STRENGTH) 20 % GEL gel Take by mouth 4 times daily as needed for mouth sores      calcium polycarbophil (FIBERCON) 625 MG tablet Take 2 tablets by mouth At Bedtime      cholecalciferol (VITAMIN D3) 25 mcg (1000 units) capsule Take 1 capsule by mouth every morning      DULoxetine (CYMBALTA) 60 MG capsule Take 120 mg by mouth every morning      fluticasone (FLONASE) 50 MCG/ACT nasal spray Spray 2 sprays into both nostrils every morning      lidocaine-prilocaine (EMLA) 2.5-2.5 % external cream Apply topically as needed for moderate pain      loperamide (IMODIUM) 2 MG capsule Take 2 mg by mouth 2 times daily as needed for diarrhea       "melatonin 3 MG tablet Take 6 mg by mouth At Bedtime      metroNIDAZOLE (METROCREAM) 0.75 % cream Apply topically At Bedtime      mirabegron (MYRBETRIQ) 50 MG 24 hr tablet Take 50 mg by mouth every morning      multivitamin, therapeutic with minerals (THERA-VIT-M) TABS tablet Take 1 tablet by mouth every morning      Omega-3 Fatty Acids (FISH OIL OMEGA-3) 1000 MG CAPS Take 2 capsules by mouth At Bedtime      omeprazole 20 MG tablet Take 20 mg by mouth every morning (before breakfast)      oxyCODONE (ROXICODONE) 5 MG tablet Take 1 tablet (5 mg) by mouth every 4 hours as needed for moderate to severe pain 30 tablet 0    risperiDONE (RISPERDAL) 4 MG tablet Take 4 mg by mouth daily At 6 PM      senna-docusate (SENOKOT-S;PERICOLACE) 8.6-50 MG per tablet Take 2 tablets by mouth 2 times daily      zinc gluconate 50 MG tablet Take 50 mg by mouth daily (with breakfast)       Controlled medications:   Medication: oxycodone , ~25 tabs given to patient at the time of discharge to take home     Past Medical History:   Past Medical History:   Diagnosis Date    Adenomatous polyp of colon     Benign neoplasm of colon     Brain aneurysm     Bruxism     Cancer (H)     possible renal cancer    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)     Depressive disorder     Developmental delay     Diverticula of colon     DJD (degenerative joint disease)     LUC (generalized anxiety disorder)     Gastroesophageal reflux disease     Hydrocephalus (H)     Hypertension     Irritable bowel syndrome     Lateral epicondylitis     Left renal mass     Marfan's syndrome     Menopausal syndrome     MR (mental retardation)     Neural hearing loss     Neurogenic bladder     Obesity     Osteoarthritis of right shoulder     Psychosis (H)     S/p nephrectomy     SAH (subarachnoid hemorrhage) (H)     Seizures (H)     Sleep apnea     uses CPAP     Physical Exam:   Vitals: BP (!) 140/80   Pulse 68   Temp 98.3  F (36.8  C)   Resp 20   Ht 1.676 m (5' 6\")   Wt " 98.5 kg (217 lb 3.2 oz)   SpO2 98%   BMI 35.06 kg/m    BMI: Body mass index is 35.06 kg/m .  GENERAL APPEARANCE:  Alert, in no distress, cooperative,   RESP:  lungs clear to auscultation , no respiratory distress   CV:  regular rate and rhythm, no murmur, rub, or gallop, 1-2+ LLE edema  ABDOMEN:  bowel sounds normal,   M/S:  decreased ROM, swelling to left knee  NEURO:   Cn 2-12 grossly intact,   PSYCH:  affect and mood normal, impaired judgement         SNF Labs: Recent labs in [facility] reviewed by me today.  and Most Recent 3 CBC's:  Recent Labs   Lab Test 07/28/23  0723 07/27/23  0716 09/24/20  0551   HGB 10.8* 9.5* 9.7*     Most Recent 3 BMP's:  Recent Labs   Lab Test 07/28/23  0723 07/27/23  0716 07/26/23  1019 09/23/20  0622   NA  --   --   --  141   POTASSIUM  --   --   --  4.9   CHLORIDE  --   --   --  105   CO2  --   --   --  30   BUN  --   --   --  13   CR  --   --   --  0.89   ANIONGAP  --   --   --  6   RADHA  --   --   --  9.7    118 121* 143*       DISCHARGE PLAN:  Follow up labs: No labs orders/due  Medical Follow Up:   Follow up with primary care provider in 2 weeks  Follow up with specialist ortho as scheduled   [facility] scheduled appointments: None.  Discharge Services: Out Patient: Physical Therapy and Occupational Therapy.  Discharge Instructions Verbalized to Patient at Discharge:   Weight bearing restrictions:  Weight bearing as tolerated.   OK to shower but no bathing or soaking until approved by surgeon.   24-hour supervision is recommended for safety.     TOTAL DISCHARGE TIME: Greater than 30 minutes  Electronically signed by:  ALESHA Conti CNP

## 2023-08-22 ENCOUNTER — LAB REQUISITION (OUTPATIENT)
Dept: LAB | Facility: CLINIC | Age: 68
End: 2023-08-22

## 2023-08-22 ENCOUNTER — DISCHARGE SUMMARY NURSING HOME (OUTPATIENT)
Dept: GERIATRICS | Facility: CLINIC | Age: 68
End: 2023-08-22
Payer: COMMERCIAL

## 2023-08-22 VITALS
WEIGHT: 217.2 LBS | BODY MASS INDEX: 34.91 KG/M2 | HEART RATE: 68 BPM | RESPIRATION RATE: 20 BRPM | DIASTOLIC BLOOD PRESSURE: 80 MMHG | OXYGEN SATURATION: 98 % | TEMPERATURE: 98.3 F | HEIGHT: 66 IN | SYSTOLIC BLOOD PRESSURE: 140 MMHG

## 2023-08-22 DIAGNOSIS — Z47.1 AFTERCARE FOLLOWING LEFT KNEE JOINT REPLACEMENT SURGERY: Primary | ICD-10-CM

## 2023-08-22 DIAGNOSIS — B37.31 YEAST INFECTION OF THE VAGINA: ICD-10-CM

## 2023-08-22 DIAGNOSIS — R62.50 DEVELOPMENTAL DELAY: ICD-10-CM

## 2023-08-22 DIAGNOSIS — R39.81 FUNCTIONAL INCONTINENCE: ICD-10-CM

## 2023-08-22 DIAGNOSIS — N30.00 ACUTE CYSTITIS WITHOUT HEMATURIA: ICD-10-CM

## 2023-08-22 DIAGNOSIS — Q03.9 CONGENITAL HYDROCEPHALUS (H): ICD-10-CM

## 2023-08-22 DIAGNOSIS — F41.1 GAD (GENERALIZED ANXIETY DISORDER): ICD-10-CM

## 2023-08-22 DIAGNOSIS — I10 PRIMARY HYPERTENSION: ICD-10-CM

## 2023-08-22 DIAGNOSIS — M17.12 PRIMARY OSTEOARTHRITIS OF LEFT KNEE: ICD-10-CM

## 2023-08-22 DIAGNOSIS — F60.3 BORDERLINE PERSONALITY DISORDER (H): ICD-10-CM

## 2023-08-22 DIAGNOSIS — R53.81 PHYSICAL DECONDITIONING: ICD-10-CM

## 2023-08-22 DIAGNOSIS — K59.03 DRUG INDUCED CONSTIPATION: ICD-10-CM

## 2023-08-22 DIAGNOSIS — D64.9 ANEMIA, UNSPECIFIED TYPE: ICD-10-CM

## 2023-08-22 DIAGNOSIS — Z96.652 AFTERCARE FOLLOWING LEFT KNEE JOINT REPLACEMENT SURGERY: Primary | ICD-10-CM

## 2023-08-22 DIAGNOSIS — G47.33 OSA (OBSTRUCTIVE SLEEP APNEA): ICD-10-CM

## 2023-08-22 DIAGNOSIS — I48.91 UNSPECIFIED ATRIAL FIBRILLATION (H): ICD-10-CM

## 2023-08-22 PROCEDURE — 99316 NF DSCHRG MGMT 30 MIN+: CPT | Performed by: NURSE PRACTITIONER

## 2023-08-22 RX ORDER — MICONAZOLE NITRATE 20 MG/G
CREAM TOPICAL 2 TIMES DAILY
COMMUNITY
Start: 2023-08-22

## 2023-08-22 NOTE — LETTER
8/22/2023        RE: Deepthi Gomez  [address]  [place] [address]        [facility] GERIATRICS DISCHARGE SUMMARY  Patient Name: Deepthi Gomez  YOB: 1955  [place] Medical Record Number: [id number]  Place of Service Where Encounter Took Place: [facility] ([facility]) [[id number]]    PRIMARY CARE PROVIDER AND CLINIC RESPONSIBLE AFTER TRANSFER: RED SHAH, [address] [place] [address] / [place] [address]; Non-[facility] Provider     Transferring providers: ALESHA Sanon CNP; Ev Waite MD  Recent Hospitalization/ED: [facility] stay 7/26/23 to 7/28/23.  Date of SNF Admission: July 28, 2023  Date of SNF (anticipated) Discharge: August 28, 2023  Discharged to: Previous Group Home  Cognitive Scores: SLUMS: 14/30  Physical Function: Ambulating 300+ ft with 2WW, SBA, is self limiting  DME: has Walker, No new DME needed    CODE STATUS/ADVANCE DIRECTIVES DISCUSSION: Full Code  ALLERGIES: Penicillins and Sulfa antibiotics    NURSING FACILITY COURSE:  Medication Changes/Rationale:   Completed 30 days of ASA on 8/26    Summary of nursing facility stay:   Aftercare following left knee joint replacement surgery  Primary osteoarthritis of left knee  Physical deconditioning  Surgery without complication. Had ortho follow, no longer needs CPM machine. Pain controlled. Made progress with therapy, was self limited at times. Edema in leg significantly improved  - continue analgesia with APAP 1000mg TID, oxycodone 5mg q4h PRN  - ASA 81mg BID x 30 days for DVT ppx  (done 8/26)  -  outpatient PT/OT at discharge  - WBAT  - follow-up with ortho as scheduled    Acute cystitis without hematuria  UC grew 10-50K pseudomonas aeruginosa. Completed course of Cipro, symptoms resolved    Primary hypertension  BP controlled 130-140, HR 60-70  - continue atenolol 25mg daily     Drug induced constipation  In the setting of narcotics, limited mobility. + BM  - continue senna-s  BID     Yeast infection of the vagina  Likely due to Abx, Completed course of Monistat.   - miconazole (MICATIN) 2 % external cream; Apply topically 2 times daily    Congenital hydrocephalus (H)  Developmental delay  SLUMS above, is discharging back to group home with support from sister    Functional incontinence  In the setting of limited mobility. Improved with advanced to be independent in room     LUC (generalized anxiety disorder)  Borderline personality disorder (H)  Follows with psychiatry last visit 5/11/23, does have history of psychosis. Does see a therapist.   - continue risperidone 4mg at bedtime, duloxetine 120mg daily, melatonin.   - supportive cares     CASSIE (obstructive sleep apnea)  - CPAP at home settings    Anemia, unspecified type  Hgb 13.2 ->9.5->10.8. No s/s of bleeding in TCU     Discharge Medications:  MED REC REQUIRED  Post Medication Reconciliation Status:  Medication reconciliation previously completed during another office visit    Current Outpatient Medications   Medication Sig Dispense Refill     miconazole (MICATIN) 2 % external cream Apply topically 2 times daily       acetaminophen (TYLENOL) 500 MG tablet Take 2 tablets (1,000 mg) by mouth every 8 hours       aspirin 81 MG EC tablet Take 1 tablet (81 mg) by mouth 2 times daily 60 tablet 0     atenolol (TENORMIN) 25 MG tablet Take 25 mg by mouth every morning       benzocaine (ORAJEL MAXIMUM STRENGTH) 20 % GEL gel Take by mouth 4 times daily as needed for mouth sores       calcium polycarbophil (FIBERCON) 625 MG tablet Take 2 tablets by mouth At Bedtime       cholecalciferol (VITAMIN D3) 25 mcg (1000 units) capsule Take 1 capsule by mouth every morning       DULoxetine (CYMBALTA) 60 MG capsule Take 120 mg by mouth every morning       fluticasone (FLONASE) 50 MCG/ACT nasal spray Spray 2 sprays into both nostrils every morning       lidocaine-prilocaine (EMLA) 2.5-2.5 % external cream Apply topically as needed for moderate pain        loperamide (IMODIUM) 2 MG capsule Take 2 mg by mouth 2 times daily as needed for diarrhea       melatonin 3 MG tablet Take 6 mg by mouth At Bedtime       metroNIDAZOLE (METROCREAM) 0.75 % cream Apply topically At Bedtime       mirabegron (MYRBETRIQ) 50 MG 24 hr tablet Take 50 mg by mouth every morning       multivitamin, therapeutic with minerals (THERA-VIT-M) TABS tablet Take 1 tablet by mouth every morning       Omega-3 Fatty Acids (FISH OIL OMEGA-3) 1000 MG CAPS Take 2 capsules by mouth At Bedtime       omeprazole 20 MG tablet Take 20 mg by mouth every morning (before breakfast)       oxyCODONE (ROXICODONE) 5 MG tablet Take 1 tablet (5 mg) by mouth every 4 hours as needed for moderate to severe pain 30 tablet 0     risperiDONE (RISPERDAL) 4 MG tablet Take 4 mg by mouth daily At 6 PM       senna-docusate (SENOKOT-S;PERICOLACE) 8.6-50 MG per tablet Take 2 tablets by mouth 2 times daily       zinc gluconate 50 MG tablet Take 50 mg by mouth daily (with breakfast)       Controlled medications:   Medication: oxycodone , ~25 tabs given to patient at the time of discharge to take home     Past Medical History:   Past Medical History:   Diagnosis Date     Adenomatous polyp of colon      Benign neoplasm of colon      Brain aneurysm      Bruxism      Cancer (H)     possible renal cancer     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)      Depressive disorder      Developmental delay      Diverticula of colon      DJD (degenerative joint disease)      LUC (generalized anxiety disorder)      Gastroesophageal reflux disease      Hydrocephalus (H)      Hypertension      Irritable bowel syndrome      Lateral epicondylitis      Left renal mass      Marfan's syndrome      Menopausal syndrome      MR (mental retardation)      Neural hearing loss      Neurogenic bladder      Obesity      Osteoarthritis of right shoulder      Psychosis (H)      S/p nephrectomy      SAH (subarachnoid hemorrhage) (H)      Seizures (H)      Sleep apnea      "uses CPAP     Physical Exam:   Vitals: BP (!) 140/80   Pulse 68   Temp 98.3  F (36.8  C)   Resp 20   Ht 1.676 m (5' 6\")   Wt 98.5 kg (217 lb 3.2 oz)   SpO2 98%   BMI 35.06 kg/m    BMI: Body mass index is 35.06 kg/m .  GENERAL APPEARANCE:  Alert, in no distress, cooperative,   RESP:  lungs clear to auscultation , no respiratory distress   CV:  regular rate and rhythm, no murmur, rub, or gallop, 1-2+ LLE edema  ABDOMEN:  bowel sounds normal,   M/S:  decreased ROM, swelling to left knee  NEURO:   Cn 2-12 grossly intact,   PSYCH:  affect and mood normal, impaired judgement         SNF Labs: Recent labs in [facility] reviewed by me today.  and Most Recent 3 CBC's:  Recent Labs   Lab Test 07/28/23  0723 07/27/23  0716 09/24/20  0551   HGB 10.8* 9.5* 9.7*     Most Recent 3 BMP's:  Recent Labs   Lab Test 07/28/23  0723 07/27/23  0716 07/26/23  1019 09/23/20  0622   NA  --   --   --  141   POTASSIUM  --   --   --  4.9   CHLORIDE  --   --   --  105   CO2  --   --   --  30   BUN  --   --   --  13   CR  --   --   --  0.89   ANIONGAP  --   --   --  6   RADHA  --   --   --  9.7    118 121* 143*       DISCHARGE PLAN:  Follow up labs: No labs orders/due  Medical Follow Up:   Follow up with primary care provider in 2 weeks  Follow up with specialist ortho as scheduled   [facility] scheduled appointments: None.  Discharge Services: Out Patient: Physical Therapy and Occupational Therapy.  Discharge Instructions Verbalized to Patient at Discharge:   Weight bearing restrictions:  Weight bearing as tolerated.   OK to shower but no bathing or soaking until approved by surgeon.   24-hour supervision is recommended for safety.     TOTAL DISCHARGE TIME: Greater than 30 minutes  Electronically signed by:  ALESHA Conti CNP        Sincerely,        ALESHA Conti CNP      "

## 2023-08-23 LAB
ANION GAP SERPL CALCULATED.3IONS-SCNC: 10 MMOL/L (ref 7–15)
BUN SERPL-MCNC: 15 MG/DL (ref 8–23)
CALCIUM SERPL-MCNC: 10.2 MG/DL (ref 8.8–10.2)
CHLORIDE SERPL-SCNC: 100 MMOL/L (ref 98–107)
CREAT SERPL-MCNC: 0.78 MG/DL (ref 0.51–0.95)
DEPRECATED HCO3 PLAS-SCNC: 29 MMOL/L (ref 22–29)
GFR SERPL CREATININE-BSD FRML MDRD: 83 ML/MIN/1.73M2
GLUCOSE SERPL-MCNC: 114 MG/DL (ref 70–99)
POTASSIUM SERPL-SCNC: 4.7 MMOL/L (ref 3.4–5.3)
SODIUM SERPL-SCNC: 139 MMOL/L (ref 136–145)

## 2023-08-23 PROCEDURE — 82310 ASSAY OF CALCIUM: CPT | Performed by: NURSE PRACTITIONER

## 2023-08-23 PROCEDURE — P9603 ONE-WAY ALLOW PRORATED MILES: HCPCS | Performed by: NURSE PRACTITIONER

## 2023-08-24 ENCOUNTER — LAB REQUISITION (OUTPATIENT)
Dept: LAB | Facility: CLINIC | Age: 68
End: 2023-08-24

## 2023-08-24 DIAGNOSIS — R19.7 DIARRHEA, UNSPECIFIED: ICD-10-CM

## 2023-08-24 PROCEDURE — 87493 C DIFF AMPLIFIED PROBE: CPT | Performed by: NURSE PRACTITIONER

## 2023-08-25 LAB — C DIFF TOX B STL QL: NEGATIVE

## (undated) DEVICE — CUSTOM PACK TOTAL KNEE ACCESSORY SOP5BTAHEA

## (undated) DEVICE — HOLDER LIMB VELCRO OR 0814-1533

## (undated) DEVICE — BANDAGE ELASTIC 6X550 LF DBL 593-96LF

## (undated) DEVICE — BLADE SAW SAGITTAL STRK DUAL CUT 4118-135-090

## (undated) DEVICE — PLATE GROUNDING ADULT W/CORD 9165L

## (undated) DEVICE — GLOVE SURG PI ULTRA TOUCH M SZ 7 LF 42670

## (undated) DEVICE — DRSG AQUACEL AG HYDROFIBER  3.5X10" 422605

## (undated) DEVICE — GLOVE BIOGEL PI SZ 8.0 40880

## (undated) DEVICE — GOWN IMPERVIOUS BREATHABLE SMART LG 89015

## (undated) DEVICE — PREP POVIDONE-IODINE 7.5% SCRUB 4OZ BOTTLE MDS093945

## (undated) DEVICE — SUCTION MANIFOLD NEPTUNE 2 SYS 4 PORT 0702-020-000

## (undated) DEVICE — A3 SUPPLIES- SEE NURSING INFO PAGE

## (undated) DEVICE — SUTURE VICRYL+ 2-0 27IN CT-1 UND VCP259H

## (undated) DEVICE — SU MONOCRYL 3-0 PS-2 18" UND MCP497G

## (undated) DEVICE — SOLUTION IRRIG 2B7127 .9NS 3000ML BAG

## (undated) DEVICE — CUSTOM PACK TOTAL KNEE SOP5BTKHEC

## (undated) DEVICE — DECANTER VIAL 2006S

## (undated) DEVICE — GLOVE BIOGEL INDICATOR 7.5 LF 41675

## (undated) DEVICE — SU STRATAFIX PDS PLUS 1 CT-1 18" SXPP1A404

## (undated) DEVICE — SUTURE VICRYL+ 1 27IN CT-1 UND VCP261H

## (undated) DEVICE — SOL WATER IRRIG 1000ML BOTTLE 2F7114

## (undated) DEVICE — CAST PADDING 6" STERILE 9046S

## (undated) DEVICE — ALCOHOL ISOPROPYL 4 OZ 70% IA7004

## (undated) DEVICE — GLOVE UNDER INDICATOR PI SZ 8.5 LF 41685

## (undated) DEVICE — TRAY PREP DRY SKIN SCRUB 067

## (undated) RX ORDER — FENTANYL CITRATE-0.9 % NACL/PF 10 MCG/ML
PLASTIC BAG, INJECTION (ML) INTRAVENOUS
Status: DISPENSED
Start: 2023-07-26

## (undated) RX ORDER — LIDOCAINE HYDROCHLORIDE 10 MG/ML
INJECTION, SOLUTION EPIDURAL; INFILTRATION; INTRACAUDAL; PERINEURAL
Status: DISPENSED
Start: 2023-07-26

## (undated) RX ORDER — ONDANSETRON 2 MG/ML
INJECTION INTRAMUSCULAR; INTRAVENOUS
Status: DISPENSED
Start: 2023-07-26

## (undated) RX ORDER — EPHEDRINE SULFATE 50 MG/ML
INJECTION, SOLUTION INTRAMUSCULAR; INTRAVENOUS; SUBCUTANEOUS
Status: DISPENSED
Start: 2023-07-26

## (undated) RX ORDER — PROPOFOL 10 MG/ML
INJECTION, EMULSION INTRAVENOUS
Status: DISPENSED
Start: 2023-07-26

## (undated) RX ORDER — DEXAMETHASONE SODIUM PHOSPHATE 10 MG/ML
INJECTION, EMULSION INTRAMUSCULAR; INTRAVENOUS
Status: DISPENSED
Start: 2023-07-26